# Patient Record
Sex: FEMALE | Race: BLACK OR AFRICAN AMERICAN | Employment: OTHER | URBAN - NONMETROPOLITAN AREA
[De-identification: names, ages, dates, MRNs, and addresses within clinical notes are randomized per-mention and may not be internally consistent; named-entity substitution may affect disease eponyms.]

---

## 2021-04-12 ENCOUNTER — HOSPITAL ENCOUNTER (EMERGENCY)
Age: 76
Discharge: HOME | End: 2021-04-12
Attending: FAMILY MEDICINE
Payer: MEDICARE

## 2021-04-12 ENCOUNTER — TRANSCRIBE ORDER (OUTPATIENT)
Dept: REGISTRATION | Age: 76
End: 2021-04-12

## 2021-04-12 ENCOUNTER — HOSPITAL ENCOUNTER (OUTPATIENT)
Dept: LAB | Age: 76
Discharge: HOME | End: 2021-04-12
Payer: MEDICARE

## 2021-04-12 VITALS
HEIGHT: 63 IN | TEMPERATURE: 98.1 F | WEIGHT: 180 LBS | HEART RATE: 98 BPM | BODY MASS INDEX: 31.89 KG/M2 | OXYGEN SATURATION: 100 % | RESPIRATION RATE: 18 BRPM | SYSTOLIC BLOOD PRESSURE: 130 MMHG | DIASTOLIC BLOOD PRESSURE: 72 MMHG

## 2021-04-12 DIAGNOSIS — D64.9 ANEMIA, UNSPECIFIED: ICD-10-CM

## 2021-04-12 DIAGNOSIS — E78.00 PURE HYPERCHOLESTEROLEMIA: ICD-10-CM

## 2021-04-12 DIAGNOSIS — E03.4 IDIOPATHIC ATROPHIC HYPOTHYROIDISM: Primary | ICD-10-CM

## 2021-04-12 DIAGNOSIS — E53.8 BIOTIN-(PROPIONYL-COA-CARBOXYLASE) LIGASE DEFICIENCY: ICD-10-CM

## 2021-04-12 DIAGNOSIS — E03.4 IDIOPATHIC ATROPHIC HYPOTHYROIDISM: ICD-10-CM

## 2021-04-12 DIAGNOSIS — N30.00 ACUTE CYSTITIS WITHOUT HEMATURIA: Primary | ICD-10-CM

## 2021-04-12 LAB
ALBUMIN SERPL-MCNC: 4 G/DL (ref 3.5–4.7)
ALBUMIN/GLOB SERPL: 1.1 {RATIO}
ALP SERPL-CCNC: 72 U/L (ref 38–126)
ALT SERPL-CCNC: 25 U/L (ref 3–52)
ANION GAP SERPL CALC-SCNC: 11 MMOL/L
APPEARANCE UR: CLEAR
AST SERPL W P-5'-P-CCNC: 26 U/L (ref 14–74)
BACTERIA URNS QL MICRO: ABNORMAL /HPF
BASOPHILS # BLD: 0 K/UL (ref 0–0.1)
BASOPHILS NFR BLD: 0 % (ref 0–2)
BILIRUB SERPL-MCNC: 0.6 MG/DL (ref 0.2–1)
BILIRUB UR QL: NEGATIVE
BUN SERPL-MCNC: 16 MG/DL (ref 9–21)
BUN/CREAT SERPL: 15
CA-I BLD-MCNC: 9.6 MG/DL (ref 8.5–10.5)
CHLORIDE SERPL-SCNC: 102 MMOL/L (ref 94–111)
CO2 SERPL-SCNC: 25 MMOL/L (ref 21–33)
COLOR UR: ABNORMAL
CREAT SERPL-MCNC: 1.1 MG/DL (ref 0.7–1.2)
EOSINOPHIL # BLD: 0.2 K/UL (ref 0–0.4)
EOSINOPHIL NFR BLD: 2 % (ref 0–5)
EPITH CASTS URNS QL MICRO: ABNORMAL /LPF (ref 0–20)
ERYTHROCYTE [DISTWIDTH] IN BLOOD BY AUTOMATED COUNT: 14.2 % (ref 11.6–14.5)
FERRITIN SERPL-MCNC: 65 NG/ML (ref 8–252)
GLOBULIN SER CALC-MCNC: 3.6 G/DL
GLUCOSE SERPL-MCNC: 96 MG/DL (ref 70–110)
GLUCOSE UR STRIP.AUTO-MCNC: NEGATIVE MG/DL
HCT VFR BLD AUTO: 35.9 % (ref 35–45)
HGB BLD-MCNC: 12.3 G/DL (ref 12–16)
HGB UR QL STRIP: ABNORMAL
IMM GRANULOCYTES # BLD AUTO: 0 K/UL
IMM GRANULOCYTES NFR BLD AUTO: 0 %
KETONES UR QL STRIP.AUTO: NEGATIVE MG/DL
LEUKOCYTE ESTERASE UR QL STRIP.AUTO: ABNORMAL
LYMPHOCYTES # BLD: 2.8 K/UL (ref 0.9–3.6)
LYMPHOCYTES NFR BLD: 34 % (ref 21–52)
MCH RBC QN AUTO: 27.6 PG (ref 24–34)
MCHC RBC AUTO-ENTMCNC: 34.3 G/DL (ref 31–37)
MCV RBC AUTO: 80.7 FL (ref 74–97)
MONOCYTES # BLD: 0.6 K/UL (ref 0.05–1.2)
MONOCYTES NFR BLD: 7 % (ref 3–10)
NEUTS SEG # BLD: 4.6 K/UL (ref 1.8–8)
NEUTS SEG NFR BLD: 57 % (ref 40–73)
NITRITE UR QL STRIP.AUTO: NEGATIVE
PH UR STRIP: 6 [PH] (ref 5–9)
PLATELET # BLD AUTO: 247 K/UL (ref 135–420)
PMV BLD AUTO: 10.3 FL (ref 9.2–11.8)
POTASSIUM SERPL-SCNC: 4 MMOL/L (ref 3.2–5.1)
PROT SERPL-MCNC: 7.6 G/DL (ref 6.1–8.4)
PROT UR STRIP-MCNC: NEGATIVE MG/DL
RBC # BLD AUTO: 4.45 M/UL (ref 4.2–5.3)
RBC #/AREA URNS HPF: ABNORMAL /HPF (ref 0–2)
SODIUM SERPL-SCNC: 138 MMOL/L (ref 135–145)
SP GR UR REFRACTOMETRY: 1.01 (ref 1–1.03)
T4 FREE SERPL-MCNC: 0.9 NG/DL (ref 0.8–1.5)
TSH SERPL DL<=0.05 MIU/L-ACNC: 1.66 UIU/ML (ref 0.35–6.2)
UROBILINOGEN UR QL STRIP.AUTO: 0.2 EU/DL (ref 0.2–1)
VIT B12 SERPL-MCNC: 1838 PG/ML (ref 193–986)
WBC # BLD AUTO: 8.1 K/UL (ref 4.6–13.2)
WBC URNS QL MICRO: ABNORMAL /HPF (ref 0–4)

## 2021-04-12 PROCEDURE — 82728 ASSAY OF FERRITIN: CPT

## 2021-04-12 PROCEDURE — 82607 VITAMIN B-12: CPT

## 2021-04-12 PROCEDURE — 80053 COMPREHEN METABOLIC PANEL: CPT

## 2021-04-12 PROCEDURE — 82746 ASSAY OF FOLIC ACID SERUM: CPT

## 2021-04-12 PROCEDURE — 82306 VITAMIN D 25 HYDROXY: CPT

## 2021-04-12 PROCEDURE — 99283 EMERGENCY DEPT VISIT LOW MDM: CPT

## 2021-04-12 PROCEDURE — 84439 ASSAY OF FREE THYROXINE: CPT

## 2021-04-12 PROCEDURE — 84443 ASSAY THYROID STIM HORMONE: CPT

## 2021-04-12 PROCEDURE — 85025 COMPLETE CBC W/AUTO DIFF WBC: CPT

## 2021-04-12 PROCEDURE — 81001 URINALYSIS AUTO W/SCOPE: CPT

## 2021-04-12 RX ORDER — PRAVASTATIN SODIUM 20 MG/1
20 TABLET ORAL
COMMUNITY

## 2021-04-12 RX ORDER — GLUCOSAMINE/CHONDR SU A SOD 750-600 MG
1000 TABLET ORAL
COMMUNITY

## 2021-04-12 RX ORDER — SERTRALINE HYDROCHLORIDE 50 MG/1
50 TABLET, FILM COATED ORAL DAILY
COMMUNITY

## 2021-04-12 RX ORDER — LANOLIN ALCOHOL/MO/W.PET/CERES
500 CREAM (GRAM) TOPICAL DAILY
COMMUNITY

## 2021-04-12 RX ORDER — HYDROCHLOROTHIAZIDE 12.5 MG/1
12.5 TABLET ORAL DAILY
COMMUNITY

## 2021-04-12 RX ORDER — LORATADINE 10 MG/1
10 TABLET ORAL
COMMUNITY

## 2021-04-12 RX ORDER — MELOXICAM 15 MG/1
15 TABLET ORAL DAILY
COMMUNITY

## 2021-04-12 RX ORDER — LOSARTAN POTASSIUM 50 MG/1
50 TABLET ORAL DAILY
COMMUNITY

## 2021-04-12 RX ORDER — FOLIC ACID 1 MG/1
1 TABLET ORAL DAILY
COMMUNITY

## 2021-04-12 RX ORDER — CEPHALEXIN 500 MG/1
500 CAPSULE ORAL 4 TIMES DAILY
Qty: 28 CAP | Refills: 0 | Status: SHIPPED | OUTPATIENT
Start: 2021-04-12 | End: 2021-04-19

## 2021-04-12 RX ORDER — CYCLOBENZAPRINE HCL 10 MG
10 TABLET ORAL
COMMUNITY

## 2021-04-12 NOTE — ED PROVIDER NOTES
EMERGENCY DEPARTMENT HISTORY AND PHYSICAL EXAM      Date: 4/12/2021  Patient Name: Nesha Rdz    History of Presenting Illness     Chief Complaint   Patient presents with    Flank Pain       History Provided By: Patient    HPI: Nesha Rdz, 76 y.o. female with a past medical history significant hypertension presents to the ED with cc of right flank pain. The patient states that she has had pain on and off for the past 3 weeks. The pain is mild. It gets better when she takes Naprosyn. She denies any nausea or vomiting with it. She denies dysuria or urinary frequency. She denies any fevers or chills. She was here to get some outpatient labs and her doctor told her to come by the emergency room for an evaluation of her pain. There are no other complaints, changes, or physical findings at this time. PCP: Juan F Garcia MD    No current facility-administered medications on file prior to encounter. Current Outpatient Medications on File Prior to Encounter   Medication Sig Dispense Refill    losartan (COZAAR) 50 mg tablet Take 50 mg by mouth daily.  sertraline (ZOLOFT) 50 mg tablet Take 50 mg by mouth daily.  loratadine (CLARITIN) 10 mg tablet Take 10 mg by mouth.  meloxicam (MOBIC) 15 mg tablet Take 15 mg by mouth daily.  pravastatin (PravachoL) 20 mg tablet Take 20 mg by mouth nightly.  hydroCHLOROthiazide (HYDRODIURIL) 12.5 mg tablet Take 12.5 mg by mouth daily.  cyclobenzaprine (FLEXERIL) 10 mg tablet Take 10 mg by mouth three (3) times daily as needed for Muscle Spasm(s).  folic acid (FOLVITE) 1 mg tablet Take 1 mg by mouth daily.  Biotin 2,500 mcg cap Take 1,000 mcg by mouth.  cyanocobalamin (VITAMIN B12) 500 mcg tablet Take 500 mcg by mouth daily.          Past History     Past Medical History:  Past Medical History:   Diagnosis Date    Arthritis     High cholesterol     Hypertension        Past Surgical History:  Past Surgical History:   Procedure Laterality Date    HX APPENDECTOMY      HX CHOLECYSTECTOMY      HX HYSTERECTOMY         Family History:  History reviewed. No pertinent family history. Social History:  Social History     Tobacco Use    Smoking status: Never Smoker    Smokeless tobacco: Never Used   Substance Use Topics    Alcohol use: Never     Frequency: Never    Drug use: Never       Allergies:  No Known Allergies      Review of Systems     Review of Systems   Constitutional: Negative for fatigue and fever. HENT: Negative for rhinorrhea and sore throat. Respiratory: Negative for cough and shortness of breath. Cardiovascular: Negative for chest pain and palpitations. Gastrointestinal: Negative for abdominal pain, diarrhea, nausea and vomiting. Genitourinary: Positive for flank pain. Negative for difficulty urinating and dysuria. Musculoskeletal: Negative for arthralgias and myalgias. Skin: Negative for color change and rash. Neurological: Negative for light-headedness and headaches. Physical Exam     Physical Exam  Vitals signs and nursing note reviewed. Constitutional:       General: She is awake. She is not in acute distress. Appearance: Normal appearance. She is well-developed. She is obese. She is not ill-appearing, toxic-appearing or diaphoretic. Interventions: Face mask in place. Comments: elderly   HENT:      Head: Normocephalic and atraumatic. Eyes:      Conjunctiva/sclera: Conjunctivae normal.      Pupils: Pupils are equal, round, and reactive to light. Neck:      Musculoskeletal: Normal range of motion and neck supple. Cardiovascular:      Rate and Rhythm: Normal rate and regular rhythm. Pulses: Normal pulses. Heart sounds: Normal heart sounds. Pulmonary:      Effort: Pulmonary effort is normal.      Breath sounds: Normal breath sounds. Abdominal:      General: Abdomen is flat. Palpations: Abdomen is soft. Tenderness:  There is no abdominal tenderness. Musculoskeletal:      Comments: Tenderness under left scapula. Skin:     General: Skin is warm and dry. Neurological:      Mental Status: She is alert and oriented to person, place, and time. GCS: GCS eye subscore is 4. GCS verbal subscore is 5. GCS motor subscore is 6. Psychiatric:         Mood and Affect: Mood and affect normal.         Behavior: Behavior normal. Behavior is cooperative. Thought Content: Thought content normal.         Lab and Diagnostic Study Results     Labs -     Recent Results (from the past 12 hour(s))   URINALYSIS W/ RFLX MICROSCOPIC    Collection Time: 04/12/21 10:35 AM   Result Value Ref Range    Color Yellow/Straw      Appearance Clear Clear      Specific gravity 1.015 1.003 - 1.035      pH (UA) 6.0 5.0 - 9.0      Protein Negative Negative mg/dL    Glucose Negative Negative mg/dL    Ketone Negative Negative mg/dL    Bilirubin Negative Negative      Blood Small (A) Negative      Urobilinogen 0.2 0.2 - 1.0 EU/dL    Nitrites Negative Negative      Leukocyte Esterase Moderate (A) Negative     URINE MICROSCOPIC    Collection Time: 04/12/21 10:35 AM   Result Value Ref Range    WBC 5-10 0 - 4 /hpf    RBC 0-5 0 - 2 /hpf    Epithelial cells Moderate 0 - 20 /lpf    Bacteria 2+ (A) None /hpf       Radiologic Studies -   @lastxrresult@  CT Results  (Last 48 hours)    None        CXR Results  (Last 48 hours)    None            Medical Decision Making   - I am the first provider for this patient. - I reviewed the vital signs, available nursing notes, past medical history, past surgical history, family history and social history. - Initial assessment performed. The patients presenting problems have been discussed, and they are in agreement with the care plan formulated and outlined with them. I have encouraged them to ask questions as they arise throughout their visit. Vital Signs-Reviewed the patient's vital signs.   Patient Vitals for the past 12 hrs:   Temp Pulse Resp BP SpO2   04/12/21 1056 -- -- -- 130/72 --   04/12/21 1045 98.1 °F (36.7 °C) 98 18 (!) 183/99 100 %       Records Reviewed: Nursing Notes    The patient presents with abdominal pain with a differential diagnosis of renal Colic, UTI, vomiting and musculoskeletal pain. ED Course:          Provider Notes (Medical Decision Making):   5189 -patient presented with some right flank pain. She denied any fevers or chills. She has a UTI. She is can be started on antibiotics. MDM       Procedures   Medical Decision Makingedical Decision Making      Disposition   Disposition:     Discharged    DISCHARGE PLAN:  1. Current Discharge Medication List      CONTINUE these medications which have NOT CHANGED    Details   losartan (COZAAR) 50 mg tablet Take 50 mg by mouth daily. sertraline (ZOLOFT) 50 mg tablet Take 50 mg by mouth daily. loratadine (CLARITIN) 10 mg tablet Take 10 mg by mouth.      meloxicam (MOBIC) 15 mg tablet Take 15 mg by mouth daily. pravastatin (PravachoL) 20 mg tablet Take 20 mg by mouth nightly. hydroCHLOROthiazide (HYDRODIURIL) 12.5 mg tablet Take 12.5 mg by mouth daily. cyclobenzaprine (FLEXERIL) 10 mg tablet Take 10 mg by mouth three (3) times daily as needed for Muscle Spasm(s). folic acid (FOLVITE) 1 mg tablet Take 1 mg by mouth daily. Biotin 2,500 mcg cap Take 1,000 mcg by mouth.      cyanocobalamin (VITAMIN B12) 500 mcg tablet Take 500 mcg by mouth daily. 2.   Follow-up Information     Follow up With Specialties Details Why Contact Info    Maria R Peck MD Internal Medicine  As needed [address] [place] [address] [address] [address] [phone]          3. Return to ED if worse   4. Current Discharge Medication List      START taking these medications    Details   cephALEXin (Keflex) 500 mg capsule Take 1 Cap by mouth four (4) times daily for 7 days.   Qty: 28 Cap, Refills: 0         CONTINUE these medications which have NOT CHANGED Details   cyclobenzaprine (FLEXERIL) 10 mg tablet Take 10 mg by mouth three (3) times daily as needed for Muscle Spasm(s). Diagnosis     Clinical Impression:   1. Acute cystitis without hematuria        Attestations:    Camilo Ochoa MD    Please note that this dictation was completed with [employer], the computer voice recognition software. Quite often unanticipated grammatical, syntax, homophones, and other interpretive errors are inadvertently transcribed by the computer software. Please disregard these errors. Please excuse any errors that have escaped final proofreading. Thank you.

## 2021-04-12 NOTE — ED TRIAGE NOTES
Pt states \"I have had right sided pressure/pulling for 2 weeks. I also noticed a bump on my stomach about 2 weeks ago. I'm having low stomach pressure too. \" Pt states that she is not having \"pain\" but the pulling/pressure sensation is intermittent.

## 2021-04-13 LAB
25(OH)D3 SERPL-MCNC: 28 NG/ML (ref 30–100)
FOLATE SERPL-MCNC: 64.4 NG/ML (ref 5–21)

## 2021-05-04 ENCOUNTER — HOSPITAL ENCOUNTER (OUTPATIENT)
Dept: LAB | Age: 76
Discharge: HOME | End: 2021-05-04

## 2021-05-04 PROCEDURE — 99001 SPECIMEN HANDLING PT-LAB: CPT

## 2021-05-14 ENCOUNTER — TRANSCRIBE ORDER (OUTPATIENT)
Dept: SCHEDULING | Age: 76
End: 2021-05-14

## 2021-05-14 DIAGNOSIS — Z12.31 VISIT FOR SCREENING MAMMOGRAM: Primary | ICD-10-CM

## 2021-06-04 ENCOUNTER — HOSPITAL ENCOUNTER (OUTPATIENT)
Dept: MAMMOGRAPHY | Age: 76
Discharge: HOME | End: 2021-06-04
Payer: MEDICARE

## 2021-06-04 DIAGNOSIS — Z12.31 VISIT FOR SCREENING MAMMOGRAM: ICD-10-CM

## 2021-06-04 PROCEDURE — 77067 SCR MAMMO BI INCL CAD: CPT

## 2021-09-27 ENCOUNTER — TRANSCRIBE ORDER (OUTPATIENT)
Dept: SCHEDULING | Age: 76
End: 2021-09-27

## 2021-09-27 DIAGNOSIS — N83.202 LEFT OVARIAN CYST: Primary | ICD-10-CM

## 2021-09-30 ENCOUNTER — HOSPITAL ENCOUNTER (OUTPATIENT)
Dept: ULTRASOUND IMAGING | Age: 76
Discharge: HOME | End: 2021-09-30
Payer: MEDICARE

## 2021-09-30 DIAGNOSIS — N83.202 LEFT OVARIAN CYST: ICD-10-CM

## 2021-09-30 PROCEDURE — 76856 US EXAM PELVIC COMPLETE: CPT

## 2021-10-07 ENCOUNTER — HOSPITAL ENCOUNTER (OUTPATIENT)
Dept: LAB | Age: 76
Discharge: HOME | End: 2021-10-07

## 2021-10-07 PROCEDURE — 99001 SPECIMEN HANDLING PT-LAB: CPT

## 2022-08-09 ENCOUNTER — APPOINTMENT (OUTPATIENT)
Dept: LAB | Age: 77
End: 2022-08-09
Payer: MEDICARE

## 2022-08-09 ENCOUNTER — HOSPITAL ENCOUNTER (OUTPATIENT)
Dept: LAB | Age: 77
Discharge: HOME | End: 2022-08-09
Payer: MEDICARE

## 2022-08-09 LAB
ALBUMIN SERPL-MCNC: 3.5 G/DL (ref 3.4–5)
ALBUMIN/GLOB SERPL: 0.8 {RATIO} (ref 0.8–1.7)
ALP SERPL-CCNC: 87 U/L (ref 45–117)
ALT SERPL-CCNC: 27 U/L (ref 13–56)
ANION GAP SERPL CALC-SCNC: 10 MMOL/L (ref 3–18)
AST SERPL W P-5'-P-CCNC: 28 U/L (ref 10–38)
BASOPHILS # BLD: 0 K/UL (ref 0–0.1)
BASOPHILS NFR BLD: 1 % (ref 0–2)
BILIRUB SERPL-MCNC: 0.6 MG/DL (ref 0.2–1)
BUN SERPL-MCNC: 17 MG/DL (ref 7–18)
BUN/CREAT SERPL: 15 (ref 12–20)
CA-I BLD-MCNC: 9.5 MG/DL (ref 8.5–10.1)
CHLORIDE SERPL-SCNC: 107 MMOL/L (ref 100–111)
CO2 SERPL-SCNC: 26 MMOL/L (ref 21–32)
CREAT SERPL-MCNC: 1.15 MG/DL (ref 0.6–1.3)
DIFFERENTIAL METHOD BLD: NORMAL
EOSINOPHIL # BLD: 0.2 K/UL (ref 0–0.4)
EOSINOPHIL NFR BLD: 2 % (ref 0–5)
ERYTHROCYTE [DISTWIDTH] IN BLOOD BY AUTOMATED COUNT: 14.1 % (ref 11.6–14.5)
GLOBULIN SER CALC-MCNC: 4.4 G/DL (ref 2–4)
GLUCOSE SERPL-MCNC: 83 MG/DL (ref 74–99)
HCT VFR BLD AUTO: 35.9 % (ref 35–45)
HGB BLD-MCNC: 12.4 G/DL (ref 12–16)
IMM GRANULOCYTES # BLD AUTO: 0 K/UL (ref 0–0.04)
IMM GRANULOCYTES NFR BLD AUTO: 0 % (ref 0–0.5)
LYMPHOCYTES # BLD: 2.8 K/UL (ref 0.9–3.6)
LYMPHOCYTES NFR BLD: 32 % (ref 21–52)
MCH RBC QN AUTO: 28.2 PG (ref 24–34)
MCHC RBC AUTO-ENTMCNC: 34.5 G/DL (ref 31–37)
MCV RBC AUTO: 81.6 FL (ref 78–100)
MONOCYTES # BLD: 0.6 K/UL (ref 0.05–1.2)
MONOCYTES NFR BLD: 7 % (ref 3–10)
NEUTS SEG # BLD: 5.2 K/UL (ref 1.8–8)
NEUTS SEG NFR BLD: 58 % (ref 40–73)
NRBC # BLD: 0 K/UL (ref 0–0.01)
NRBC BLD-RTO: 0 PER 100 WBC
PLATELET # BLD AUTO: 230 K/UL (ref 135–420)
PMV BLD AUTO: 10.5 FL (ref 9.2–11.8)
POTASSIUM SERPL-SCNC: 3.9 MMOL/L (ref 3.5–5.5)
PROT SERPL-MCNC: 7.9 G/DL (ref 6.4–8.2)
RBC # BLD AUTO: 4.4 M/UL (ref 4.2–5.3)
SODIUM SERPL-SCNC: 143 MMOL/L (ref 136–145)
TSH SERPL DL<=0.05 MIU/L-ACNC: 1.6 UIU/ML (ref 0.36–3.74)
WBC # BLD AUTO: 8.8 K/UL (ref 4.6–13.2)

## 2022-08-09 PROCEDURE — 82306 VITAMIN D 25 HYDROXY: CPT

## 2022-08-09 PROCEDURE — 80053 COMPREHEN METABOLIC PANEL: CPT

## 2022-08-09 PROCEDURE — 80061 LIPID PANEL: CPT

## 2022-08-09 PROCEDURE — 84439 ASSAY OF FREE THYROXINE: CPT

## 2022-08-09 PROCEDURE — 84443 ASSAY THYROID STIM HORMONE: CPT

## 2022-08-09 PROCEDURE — 82728 ASSAY OF FERRITIN: CPT

## 2022-08-09 PROCEDURE — 82607 VITAMIN B-12: CPT

## 2022-08-09 PROCEDURE — 85025 COMPLETE CBC W/AUTO DIFF WBC: CPT

## 2022-08-10 LAB
25(OH)D3 SERPL-MCNC: 36.2 NG/ML (ref 30–100)
CHOLEST SERPL-MCNC: 110 MG/DL
FERRITIN SERPL-MCNC: 82 NG/ML (ref 8–388)
FOLATE SERPL-MCNC: >20 NG/ML (ref 3.1–17.5)
HDLC SERPL-MCNC: 39 MG/DL (ref 40–60)
HDLC SERPL: 2.8 {RATIO} (ref 0–5)
LDLC SERPL CALC-MCNC: 52.6 MG/DL (ref 0–100)
LIPID PROFILE,FLP: ABNORMAL
T4 FREE SERPL-MCNC: 1.1 NG/DL (ref 0.7–1.5)
TRIGL SERPL-MCNC: 92 MG/DL (ref ?–150)
VIT B12 SERPL-MCNC: 1297 PG/ML (ref 211–911)
VLDLC SERPL CALC-MCNC: 18.4 MG/DL

## 2022-09-09 ENCOUNTER — TRANSCRIBE ORDER (OUTPATIENT)
Dept: SCHEDULING | Age: 77
End: 2022-09-09

## 2022-09-09 DIAGNOSIS — Z12.31 VISIT FOR SCREENING MAMMOGRAM: Primary | ICD-10-CM

## 2022-09-26 ENCOUNTER — HOSPITAL ENCOUNTER (OUTPATIENT)
Dept: MAMMOGRAPHY | Age: 77
Discharge: HOME | End: 2022-09-26
Payer: MEDICARE

## 2022-09-26 DIAGNOSIS — Z12.31 VISIT FOR SCREENING MAMMOGRAM: ICD-10-CM

## 2022-09-26 PROCEDURE — 77063 BREAST TOMOSYNTHESIS BI: CPT

## 2022-10-05 ENCOUNTER — TRANSCRIBE ORDER (OUTPATIENT)
Dept: SCHEDULING | Age: 77
End: 2022-10-05

## 2022-10-05 DIAGNOSIS — R19.09 GROIN SWELLING: Primary | ICD-10-CM

## 2022-10-05 DIAGNOSIS — N39.0 UTI (URINARY TRACT INFECTION): ICD-10-CM

## 2022-10-05 DIAGNOSIS — R10.2 ADNEXAL PAIN: ICD-10-CM

## 2022-10-05 DIAGNOSIS — R31.9 BLOOD IN URINE: ICD-10-CM

## 2022-10-10 ENCOUNTER — HOSPITAL ENCOUNTER (OUTPATIENT)
Dept: ULTRASOUND IMAGING | Age: 77
Discharge: HOME | End: 2022-10-10
Payer: MEDICARE

## 2022-10-10 DIAGNOSIS — R10.2 ADNEXAL PAIN: ICD-10-CM

## 2022-10-10 DIAGNOSIS — N39.0 UTI (URINARY TRACT INFECTION): ICD-10-CM

## 2022-10-10 DIAGNOSIS — R31.9 BLOOD IN URINE: ICD-10-CM

## 2022-10-10 DIAGNOSIS — R19.09 GROIN SWELLING: ICD-10-CM

## 2022-10-10 PROCEDURE — 76770 US EXAM ABDO BACK WALL COMP: CPT

## 2022-10-10 PROCEDURE — 76856 US EXAM PELVIC COMPLETE: CPT

## 2023-05-08 ENCOUNTER — HOSPITAL ENCOUNTER (OUTPATIENT)
Age: 78
Discharge: HOME | End: 2023-05-11

## 2023-05-08 LAB — LABCORP DRAW FEE: NORMAL

## 2023-05-08 PROCEDURE — 99001 SPECIMEN HANDLING PT-LAB: CPT

## 2023-10-05 ENCOUNTER — HOSPITAL ENCOUNTER (OUTPATIENT)
Age: 78
Discharge: HOME | End: 2023-10-05
Payer: MEDICARE

## 2023-10-05 VITALS — BODY MASS INDEX: 33.13 KG/M2 | WEIGHT: 187 LBS | HEIGHT: 63 IN

## 2023-10-05 DIAGNOSIS — Z12.31 VISIT FOR SCREENING MAMMOGRAM: ICD-10-CM

## 2023-10-05 PROCEDURE — 77067 SCR MAMMO BI INCL CAD: CPT

## 2023-11-02 ENCOUNTER — HOSPITAL ENCOUNTER (OUTPATIENT)
Age: 78
Discharge: HOME | End: 2023-11-05

## 2023-11-02 ENCOUNTER — HOSPITAL ENCOUNTER (OUTPATIENT)
Age: 78
Discharge: HOME | End: 2023-11-02
Payer: MEDICARE

## 2023-11-02 DIAGNOSIS — M15.3 SECONDARY LOCALIZED OSTEOARTHROSIS OF MULTIPLE SITES: ICD-10-CM

## 2023-11-02 PROCEDURE — 73610 X-RAY EXAM OF ANKLE: CPT

## 2024-04-29 ENCOUNTER — HOSPITAL ENCOUNTER (EMERGENCY)
Age: 79
Discharge: HOME | End: 2024-04-29
Attending: EMERGENCY MEDICINE
Payer: MEDICARE

## 2024-04-29 VITALS
TEMPERATURE: 98.2 F | HEART RATE: 87 BPM | OXYGEN SATURATION: 95 % | WEIGHT: 185 LBS | RESPIRATION RATE: 13 BRPM | BODY MASS INDEX: 34.04 KG/M2 | SYSTOLIC BLOOD PRESSURE: 124 MMHG | DIASTOLIC BLOOD PRESSURE: 80 MMHG | HEIGHT: 62 IN
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
## 2024-04-29 PROCEDURE — 99283 EMERGENCY DEPT VISIT LOW MDM: CPT

## 2024-04-29 RX ORDER — LIDOCAINE 4 G/G
1 PATCH TOPICAL DAILY
Qty: 30 PATCH | Refills: 0 | Status: SHIPPED | OUTPATIENT
Start: 2024-04-29 | End: 2024-05-29

## 2024-04-29 NOTE — ED TRIAGE NOTES
Reports that she fell out of the back door this morning. Bumped her head on the railing. States that she has had back, bilateral knee and bilateral ankle pain/issues for years \" but I just need to have them checked out\". States that her doctor is following her for arthritic pain of knees and ankles and she takes medication for this discomfort. Skin is intact. No bruising or abrasions. Scalp assessed and no bruising, swelling or abrasions were found. States that she has had back, knee and ankle problems for years. Nothing new today.

## 2024-04-29 NOTE — ED PROVIDER NOTES
[facility] EMERGENCY DEPT  EMERGENCY DEPARTMENT HISTORY AND PHYSICAL EXAM      Date: 4/29/2024  Patient Name: Yolie Limon  MRN: [id number]  Birthdate 1945  Date of evaluation: 4/29/2024  Provider: Gilbert Duggan MD   Note Started: 11:31 AM EDT 4/29/24    HISTORY OF PRESENT ILLNESS     Chief Complaint   Patient presents with    Fall       History Provided By: Patient    HPI: Yolie Limon is a 78 y.o. female was steipping onto back porch and knee gave out, falling down. Mild head injury to rail on left.     States has chronic pain in lower back, bilateral knees but states this is chronic and unchanged.     No blood thinning medications.     PAST MEDICAL HISTORY   Past Medical History:  Past Medical History:   Diagnosis Date    Arthritis     Breast cyst     High cholesterol     Hypertension     Menopause        Past Surgical History:  Past Surgical History:   Procedure Laterality Date    APPENDECTOMY      CHOLECYSTECTOMY      HYSTERECTOMY (CERVIX STATUS UNKNOWN)      Partial       Family History:  History reviewed. No pertinent family history.    Social History:  Social History     Tobacco Use    Smoking status: Never    Smokeless tobacco: Never   Substance Use Topics    Alcohol use: Never    Drug use: Never       Allergies:  No Known Allergies    PCP: Desiree Tipton MD    Current Meds:   No current facility-administered medications for this encounter.     Current Outpatient Medications   Medication Sig Dispense Refill    lidocaine 4 % external patch Place 1 patch onto the skin daily 30 patch 0    Biotin 2.5 MG CAPS Take 1,000 mcg by mouth      cyanocobalamin 500 MCG tablet Take 500 mcg by mouth daily      cyclobenzaprine (FLEXERIL) 10 MG tablet Take 10 mg by mouth 3 times daily as needed      folic acid (FOLVITE) 1 MG tablet Take 1 mg by mouth daily      hydroCHLOROthiazide (HYDRODIURIL) 12.5 MG tablet Take 12.5 mg by mouth daily      loratadine (CLARITIN) 10 MG tablet Take 10 mg by mouth

## 2024-07-25 ENCOUNTER — HOSPITAL ENCOUNTER (OUTPATIENT)
Age: 79
Discharge: HOME | End: 2024-07-28

## 2024-07-25 LAB — LABCORP DRAW FEE: NORMAL

## 2024-07-25 PROCEDURE — 99001 SPECIMEN HANDLING PT-LAB: CPT

## 2024-08-27 ENCOUNTER — HOSPITAL ENCOUNTER (EMERGENCY)
Age: 79
Discharge: ANOTHER HOSPITAL | End: 2024-08-28
Attending: EMERGENCY MEDICINE
Payer: MEDICARE

## 2024-08-27 DIAGNOSIS — D68.9 COAGULOPATHY (HCC): ICD-10-CM

## 2024-08-27 DIAGNOSIS — N94.89 ADNEXAL MASS: ICD-10-CM

## 2024-08-27 DIAGNOSIS — K76.7 HEPATORENAL FAILURE (HCC): Primary | ICD-10-CM

## 2024-08-27 PROCEDURE — 99285 EMERGENCY DEPT VISIT HI MDM: CPT

## 2024-08-27 RX ORDER — CEFUROXIME AXETIL 500 MG/1
500 TABLET ORAL 2 TIMES DAILY
Status: ON HOLD | COMMUNITY
End: 2024-09-01 | Stop reason: HOSPADM

## 2024-08-27 ASSESSMENT — LIFESTYLE VARIABLES
HOW MANY STANDARD DRINKS CONTAINING ALCOHOL DO YOU HAVE ON A TYPICAL DAY: PATIENT DOES NOT DRINK
HOW OFTEN DO YOU HAVE A DRINK CONTAINING ALCOHOL: NEVER

## 2024-08-27 ASSESSMENT — PAIN SCALES - GENERAL: PAINLEVEL_OUTOF10: 5

## 2024-08-27 ASSESSMENT — PAIN DESCRIPTION - ORIENTATION: ORIENTATION: RIGHT;LEFT

## 2024-08-27 ASSESSMENT — PAIN DESCRIPTION - PAIN TYPE: TYPE: ACUTE PAIN

## 2024-08-27 ASSESSMENT — PAIN - FUNCTIONAL ASSESSMENT: PAIN_FUNCTIONAL_ASSESSMENT: 0-10

## 2024-08-27 ASSESSMENT — PAIN DESCRIPTION - LOCATION: LOCATION: FLANK

## 2024-08-28 ENCOUNTER — HOSPITAL ENCOUNTER (INPATIENT)
Facility: HOSPITAL | Age: 79
LOS: 4 days | Discharge: HOME HEALTH | DRG: 872 | End: 2024-09-01
Attending: STUDENT IN AN ORGANIZED HEALTH CARE EDUCATION/TRAINING PROGRAM | Admitting: HOSPITALIST
Payer: MEDICARE

## 2024-08-28 ENCOUNTER — APPOINTMENT (OUTPATIENT)
Age: 79
End: 2024-08-28
Payer: MEDICARE

## 2024-08-28 VITALS
WEIGHT: 171 LBS | DIASTOLIC BLOOD PRESSURE: 65 MMHG | HEART RATE: 81 BPM | OXYGEN SATURATION: 93 % | TEMPERATURE: 97.6 F | RESPIRATION RATE: 14 BRPM | BODY MASS INDEX: 26.84 KG/M2 | SYSTOLIC BLOOD PRESSURE: 112 MMHG | HEIGHT: 67 IN

## 2024-08-28 PROBLEM — N17.9 AKI (ACUTE KIDNEY INJURY) (HCC): Status: ACTIVE | Noted: 2024-08-28

## 2024-08-28 LAB
ALBUMIN SERPL-MCNC: 3.1 G/DL (ref 3.4–5)
ALBUMIN/GLOB SERPL: 0.6 (ref 0.8–1.7)
ALP SERPL-CCNC: 390 U/L (ref 45–117)
ALT SERPL-CCNC: 178 U/L (ref 13–56)
ANION GAP SERPL CALC-SCNC: 12 MMOL/L (ref 3–18)
APPEARANCE UR: ABNORMAL
APTT PPP: 30.6 SEC (ref 23–36.4)
AST SERPL W P-5'-P-CCNC: 488 U/L (ref 10–38)
BACTERIA URNS QL MICRO: ABNORMAL /HPF
BASOPHILS # BLD: 0 K/UL (ref 0–0.1)
BASOPHILS NFR BLD: 0 % (ref 0–2)
BILIRUB SERPL-MCNC: 18 MG/DL (ref 0.2–1)
BILIRUB UR QL: ABNORMAL
BUN SERPL-MCNC: 91 MG/DL (ref 7–18)
BUN/CREAT SERPL: 26 (ref 12–20)
CA-I BLD-MCNC: 10.9 MG/DL (ref 8.5–10.1)
CHLORIDE SERPL-SCNC: 105 MMOL/L (ref 100–111)
CO2 SERPL-SCNC: 20 MMOL/L (ref 21–32)
COLOR UR: ABNORMAL
CREAT SERPL-MCNC: 3.44 MG/DL (ref 0.6–1.3)
DIFFERENTIAL METHOD BLD: ABNORMAL
EOSINOPHIL # BLD: 0 K/UL (ref 0–0.4)
EOSINOPHIL NFR BLD: 0 % (ref 0–5)
EPITH CASTS URNS QL MICRO: ABNORMAL /LPF (ref 0–20)
ERYTHROCYTE [DISTWIDTH] IN BLOOD BY AUTOMATED COUNT: 16.9 % (ref 11.6–14.5)
GLOBULIN SER CALC-MCNC: 5.4 G/DL (ref 2–4)
GLUCOSE SERPL-MCNC: 94 MG/DL (ref 74–99)
GLUCOSE UR STRIP.AUTO-MCNC: NEGATIVE MG/DL
HCT VFR BLD AUTO: 34.5 % (ref 35–45)
HGB BLD-MCNC: 13 G/DL (ref 12–16)
HGB UR QL STRIP: NEGATIVE
IMM GRANULOCYTES # BLD AUTO: 0 K/UL
IMM GRANULOCYTES NFR BLD AUTO: 0 %
INR PPP: 1.5 (ref 0.9–1.1)
KETONES UR QL STRIP.AUTO: NEGATIVE MG/DL
LEUKOCYTE ESTERASE UR QL STRIP.AUTO: ABNORMAL
LIPASE SERPL-CCNC: 140 U/L (ref 13–75)
LYMPHOCYTES # BLD: 2.8 K/UL (ref 0.9–3.6)
LYMPHOCYTES NFR BLD: 10 % (ref 21–52)
MCH RBC QN AUTO: 28.3 PG (ref 24–34)
MCHC RBC AUTO-ENTMCNC: 37.7 G/DL (ref 31–37)
MCV RBC AUTO: 75 FL (ref 78–100)
MONOCYTES # BLD: 0.3 K/UL (ref 0.05–1.2)
MONOCYTES NFR BLD: 1 % (ref 3–10)
NEUTS SEG # BLD: 24.8 K/UL (ref 1.8–8)
NEUTS SEG NFR BLD: 89 % (ref 40–73)
NITRITE UR QL STRIP.AUTO: POSITIVE
NRBC # BLD: 0.18 K/UL (ref 0–0.01)
NRBC BLD-RTO: 0.6 PER 100 WBC
PH UR STRIP: 5 (ref 5–8)
PLATELET # BLD AUTO: 254 K/UL (ref 135–420)
PMV BLD AUTO: 10.6 FL (ref 9.2–11.8)
POTASSIUM SERPL-SCNC: 5.1 MMOL/L (ref 3.5–5.5)
PROT SERPL-MCNC: 8.5 G/DL (ref 6.4–8.2)
PROT UR STRIP-MCNC: 100 MG/DL
PROTHROMBIN TIME: 17.8 SEC (ref 11.9–14.9)
RBC # BLD AUTO: 4.6 M/UL (ref 4.2–5.3)
RBC #/AREA URNS HPF: ABNORMAL /HPF (ref 0–2)
RBC MORPH BLD: ABNORMAL
SODIUM SERPL-SCNC: 137 MMOL/L (ref 136–145)
SP GR UR REFRACTOMETRY: 1.02 (ref 1–1.03)
THERAPEUTIC RANGE: NORMAL SEC (ref 73–110)
UROBILINOGEN UR QL STRIP.AUTO: 1 EU/DL (ref 0.2–1)
WBC # BLD AUTO: 27.9 K/UL (ref 4.6–13.2)
WBC URNS QL MICRO: ABNORMAL /HPF (ref 0–4)

## 2024-08-28 PROCEDURE — 99223 1ST HOSP IP/OBS HIGH 75: CPT | Performed by: HOSPITALIST

## 2024-08-28 PROCEDURE — 2580000003 HC RX 258: Performed by: EMERGENCY MEDICINE

## 2024-08-28 PROCEDURE — 83690 ASSAY OF LIPASE: CPT

## 2024-08-28 PROCEDURE — 6360000002 HC RX W HCPCS: Performed by: EMERGENCY MEDICINE

## 2024-08-28 PROCEDURE — P9047 ALBUMIN (HUMAN), 25%, 50ML: HCPCS | Performed by: INTERNAL MEDICINE

## 2024-08-28 PROCEDURE — 94761 N-INVAS EAR/PLS OXIMETRY MLT: CPT

## 2024-08-28 PROCEDURE — 80053 COMPREHEN METABOLIC PANEL: CPT

## 2024-08-28 PROCEDURE — 71250 CT THORAX DX C-: CPT

## 2024-08-28 PROCEDURE — 96374 THER/PROPH/DIAG INJ IV PUSH: CPT

## 2024-08-28 PROCEDURE — 2580000003 HC RX 258: Performed by: HOSPITALIST

## 2024-08-28 PROCEDURE — 96361 HYDRATE IV INFUSION ADD-ON: CPT

## 2024-08-28 PROCEDURE — 1100000000 HC RM PRIVATE

## 2024-08-28 PROCEDURE — 85025 COMPLETE CBC W/AUTO DIFF WBC: CPT

## 2024-08-28 PROCEDURE — 6360000002 HC RX W HCPCS: Performed by: HOSPITALIST

## 2024-08-28 PROCEDURE — 85730 THROMBOPLASTIN TIME PARTIAL: CPT

## 2024-08-28 PROCEDURE — 6360000002 HC RX W HCPCS: Performed by: INTERNAL MEDICINE

## 2024-08-28 PROCEDURE — 85610 PROTHROMBIN TIME: CPT

## 2024-08-28 PROCEDURE — 81001 URINALYSIS AUTO W/SCOPE: CPT

## 2024-08-28 RX ORDER — SODIUM CHLORIDE 0.9 % (FLUSH) 0.9 %
5-40 SYRINGE (ML) INJECTION EVERY 12 HOURS SCHEDULED
Status: DISCONTINUED | OUTPATIENT
Start: 2024-08-28 | End: 2024-09-01 | Stop reason: HOSPADM

## 2024-08-28 RX ORDER — 0.9 % SODIUM CHLORIDE 0.9 %
500 INTRAVENOUS SOLUTION INTRAVENOUS ONCE
Status: COMPLETED | OUTPATIENT
Start: 2024-08-28 | End: 2024-08-28

## 2024-08-28 RX ORDER — 0.9 % SODIUM CHLORIDE 0.9 %
1000 INTRAVENOUS SOLUTION INTRAVENOUS ONCE
Status: COMPLETED | OUTPATIENT
Start: 2024-08-28 | End: 2024-08-28

## 2024-08-28 RX ORDER — HEPARIN SODIUM 5000 [USP'U]/ML
5000 INJECTION, SOLUTION INTRAVENOUS; SUBCUTANEOUS EVERY 8 HOURS SCHEDULED
Status: DISCONTINUED | OUTPATIENT
Start: 2024-08-28 | End: 2024-09-01 | Stop reason: HOSPADM

## 2024-08-28 RX ORDER — ONDANSETRON 2 MG/ML
4 INJECTION INTRAMUSCULAR; INTRAVENOUS EVERY 6 HOURS PRN
Status: DISCONTINUED | OUTPATIENT
Start: 2024-08-28 | End: 2024-09-01 | Stop reason: HOSPADM

## 2024-08-28 RX ORDER — FOLIC ACID 1 MG/1
1 TABLET ORAL DAILY
Status: DISCONTINUED | OUTPATIENT
Start: 2024-08-29 | End: 2024-09-01 | Stop reason: HOSPADM

## 2024-08-28 RX ORDER — ONDANSETRON 4 MG/1
4 TABLET, ORALLY DISINTEGRATING ORAL EVERY 8 HOURS PRN
Status: DISCONTINUED | OUTPATIENT
Start: 2024-08-28 | End: 2024-09-01 | Stop reason: HOSPADM

## 2024-08-28 RX ORDER — SODIUM CHLORIDE 9 MG/ML
INJECTION, SOLUTION INTRAVENOUS PRN
Status: DISCONTINUED | OUTPATIENT
Start: 2024-08-28 | End: 2024-09-01 | Stop reason: HOSPADM

## 2024-08-28 RX ORDER — UREA 10 %
500 LOTION (ML) TOPICAL DAILY
Status: DISCONTINUED | OUTPATIENT
Start: 2024-08-29 | End: 2024-09-01 | Stop reason: HOSPADM

## 2024-08-28 RX ORDER — ACETAMINOPHEN 500 MG
500 TABLET ORAL EVERY 8 HOURS PRN
Status: DISCONTINUED | OUTPATIENT
Start: 2024-08-28 | End: 2024-09-01 | Stop reason: HOSPADM

## 2024-08-28 RX ORDER — POLYETHYLENE GLYCOL 3350 17 G/17G
17 POWDER, FOR SOLUTION ORAL DAILY PRN
Status: DISCONTINUED | OUTPATIENT
Start: 2024-08-28 | End: 2024-09-01 | Stop reason: HOSPADM

## 2024-08-28 RX ORDER — ALBUMIN (HUMAN) 12.5 G/50ML
25 SOLUTION INTRAVENOUS EVERY 8 HOURS
Status: DISCONTINUED | OUTPATIENT
Start: 2024-08-28 | End: 2024-08-29 | Stop reason: DRUGHIGH

## 2024-08-28 RX ORDER — SODIUM CHLORIDE 0.9 % (FLUSH) 0.9 %
5-40 SYRINGE (ML) INJECTION PRN
Status: DISCONTINUED | OUTPATIENT
Start: 2024-08-28 | End: 2024-09-01 | Stop reason: HOSPADM

## 2024-08-28 RX ADMIN — ALBUMIN (HUMAN) 25 G: 0.25 INJECTION, SOLUTION INTRAVENOUS at 18:44

## 2024-08-28 RX ADMIN — SODIUM CHLORIDE 500 ML: 9 INJECTION, SOLUTION INTRAVENOUS at 01:13

## 2024-08-28 RX ADMIN — WATER 1000 MG: 1 INJECTION INTRAMUSCULAR; INTRAVENOUS; SUBCUTANEOUS at 04:34

## 2024-08-28 RX ADMIN — SODIUM CHLORIDE 1000 ML: 9 INJECTION, SOLUTION INTRAVENOUS at 06:09

## 2024-08-28 RX ADMIN — SODIUM CHLORIDE, PRESERVATIVE FREE 10 ML: 5 INJECTION INTRAVENOUS at 21:42

## 2024-08-28 RX ADMIN — HEPARIN SODIUM 5000 UNITS: 5000 INJECTION INTRAVENOUS; SUBCUTANEOUS at 15:44

## 2024-08-28 ASSESSMENT — PAIN SCALES - GENERAL
PAINLEVEL_OUTOF10: 0

## 2024-08-28 NOTE — ED TRIAGE NOTES
Patient brought to room via wheelchair. Patient reports bilateral Flank pain for past 3 days. Patient seen PCP this past week and was placed on Ceftin for a UTI and has been taking them twice a day but has started having diarrhea and she stopped taking them.

## 2024-08-28 NOTE — CARE COORDINATION
08/28/24 1529   Service Assessment   Patient Orientation Alert and Oriented   Cognition Alert   History Provided By Patient   Primary Caregiver Self   Accompanied By/Relationship Daughter Wen and son Jesus   Support Systems Children;Family Members   Patient's Healthcare Decision Maker is: Legal Next of Kin   PCP Verified by CM Yes   Last Visit to PCP Within last 3 months   Prior Functional Level Independent in ADLs/IADLs   Current Functional Level Assistance with the following:;Housework;Shopping;Mobility   Can patient return to prior living arrangement Unknown at present   Ability to make needs known: Good   Family able to assist with home care needs: Yes   Would you like for me to discuss the discharge plan with any other family members/significant others, and if so, who? Yes  (My children)   Financial Resources Medicare;Medicaid   Community Resources None   Social/Functional History   Lives With Family   Type of Home House   Home Layout One level   Home Access Stairs to enter with rails   Entrance Stairs - Number of Steps 4   Entrance Stairs - Rails Both   Bathroom Shower/Tub Tub/Shower unit   Bathroom Toilet Standard   Bathroom Equipment None   Bathroom Accessibility Accessible   Home Equipment Walker - Rolling   Receives Help From Family   ADL Assistance Independent   Homemaking Assistance Independent   Homemaking Responsibilities Yes   Ambulation Assistance Needs assistance   Transfer Assistance Independent   Active  Yes   Mode of Transportation Car   Occupation Retired   Discharge Planning   Type of Residence Skilled Nursing Facility   Living Arrangements Family Members   Current Services Prior To Admission None   Potential Assistance Needed N/A   DME Ordered? No   Potential Assistance Purchasing Medications No   Type of Home Care Services None   Patient expects to be discharged to: Unknown   One/Two Story Residence One story   History of falls? 0   Services At/After Discharge   Transition of

## 2024-08-28 NOTE — PROGRESS NOTES
[facility]      Patient: Yolie Limon Age: 79 y.o.   Date/Time: 8/28/2024    Yolie Limon was admitted to [facility] [id number]. Ms Wen Limon was at the bedside on the day of admission.       Signed By: Gisell Martinez MD     August 28, 2024 4:07 PM

## 2024-08-28 NOTE — ED PROVIDER NOTES
EMERGENCY DEPARTMENT HISTORY AND PHYSICAL EXAM    Date: 8/27/2024  Patient Name: Yolie Limon    History of Presenting Illness     Chief Complaint   Patient presents with    Flank Pain     Bilateral         History Provided By: Patient and Patient's Daughter    Additional History (Context):   1:05 AM EDT  Yolie Limon is a 79 y.o. female with PMHX of hypertension, arthritis, menopause, high cholesterol status post appendectomy status post hysterectomy who presents to the emergency department C/O abdominal pain flank pain.  Patient comes in with several days of fatigue malaise totaling 3 days.  Patient states he was seen originally by primary care doctor last week was started on Ceftin for an antibiotic after urinary infection.  Looking back patient has also been followed by left-sided adrenal cyst.  I asked about abdominal surgeries including hysterectomy and she responds they took both ovaries as well as the uterus.  I review some of her chart records mention partial hysterectomy others hysterectomy with bilateral oophorectomy.  She has a jaundice appearance to her eyes denies any history of gallbladder problems pancreas problems liver problems, no history of hepatitis (patient already had her gallbladder removed) or alcohol use or abuse.  She also denies exotic travel.  She denies excessive use of Tylenol  That said there were 2 imaging studies in October 2022 for bloody urine and UTI also for groin swelling and adnexal pain.  Renal ultrasound from October 22 demonstrates no hydronephrosis or renal calculus along with no solid renal mass identified.  Also during the same visit October 2022 ultrasound of the pelvis complete demonstrates surgically absent uterus as well as right ovary and adnexa surgically absent while the left ovary and adnexa show a left-sided adrenal cyst measuring 2.6 x 2.7 cm and unchanged compared to previous studies.    Social History  She denies smoking drinking or

## 2024-08-28 NOTE — PROGRESS NOTES
4 Eyes Skin Assessment     NAME:  Yolie Limon  YOB: 1945  MEDICAL RECORD NUMBER:  [id number]    The patient is being assessed for  Admission    I agree that at least one RN has performed a thorough Head to Toe Skin Assessment on the patient. ALL assessment sites listed below have been assessed.      Areas assessed by both nurses:    Head, Face, Ears, Shoulders, Back, Chest, Arms, Elbows, Hands, Sacrum. Buttock, Coccyx, Ischium, and Legs. Feet and Heels        Does the Patient have a Wound? No noted wound(s)       Michele Prevention initiated by RN: Yes  Wound Care Orders initiated by RN: No    Pressure Injury (Stage 3,4, Unstageable, DTI, NWPT, and Complex wounds) if present, place Wound referral order by RN under : No    New Ostomies, if present place, Ostomy referral order under : No     Nurse 1 eSignature: Electronically signed by Lorie Storey RN on 8/28/24 at 6:57 PM EDT    **SHARE this note so that the co-signing nurse can place an eSignature**    Nurse 2 eSignature: Electronically signed by Shannan Gonsalez RN on 8/28/24 at 6:57 PM EDT

## 2024-08-28 NOTE — CONSULTS
INTERVENTIONAL RADIOLOGY CONSULT                  Yolie Limon is a 79 y.o. female who is being seen at the request of Gisell Reeves MD  for liver masses concerning for metastatic disease.    Indication/CC: Liver masses concerning for metastatic disease    Impression:     Patient Active Problem List    Diagnosis Date Noted    LUNA (acute kidney injury) (HCC) 08/28/2024         Plan:   1.  Will add a scheduled for tomorrow 8/29/2024 schedule hours for liver mass biopsy  2.  Consents for procedure  and sedation obtained    NPO: After midnight tonight    Blood Thinners: hold SQ heparin    Coag/Plt count: Labs reviewed and okay to proceed    History of present illness: Patient past medical history of high blood pressure arthritis menopause high cholesterol status post hysterectomy and bilateral oophorectomy presenting to the hospital after 3-day history of increasing dull aching down nondescript abdominal pain with generalized malaise and fatigue without nausea and vomiting.  Patient initially thought that she had a UTI and was being treated for antibiotics but patient's pain persisted as well as increasing generalized malaise and fatigue.  Patient presented to hospital CT scan was performed that showed multiple liver masses concerning for metastatic disease and unknown etiology patient will need liver mass biopsy for histologic diagnosis and staging to guide future treatment planning and goals of care discussions      Imaging: CT scan reviewed    Past Medical History:  Past Medical History:   Diagnosis Date    Arthritis     Breast cyst     High cholesterol     Hypertension     Menopause        Medications:   Prior to Admission medications    Medication Sig Start Date End Date Taking? Authorizing Provider   cefUROXime (CEFTIN) 500 MG tablet Take 1 tablet by mouth 2 times daily For 10 days   Yes Provider, MD Radha   cyclobenzaprine (FLEXERIL) 10 MG tablet Take 1 tablet by mouth 3 times daily  as needed   Yes Automatic Reconciliation, Ar   hydroCHLOROthiazide (HYDRODIURIL) 12.5 MG tablet Take 1 tablet by mouth daily   Yes Automatic Reconciliation, Ar   losartan (COZAAR) 50 MG tablet Take 1 tablet by mouth daily   Yes Automatic Reconciliation, Ar   meloxicam (MOBIC) 15 MG tablet Take 1 tablet by mouth daily   Yes Automatic Reconciliation, Ar   pravastatin (PRAVACHOL) 20 MG tablet Take 1 tablet by mouth   Yes Automatic Reconciliation, Ar   Biotin 2.5 MG CAPS Take 1,000 mcg by mouth    Automatic Reconciliation, Ar   cyanocobalamin 500 MCG tablet Take 500 mcg by mouth daily    Automatic Reconciliation, Ar   folic acid (FOLVITE) 1 MG tablet Take 1 mg by mouth daily    Automatic Reconciliation, Ar   loratadine (CLARITIN) 10 MG tablet Take 10 mg by mouth  Patient not taking: Reported on 8/28/2024    Automatic Reconciliation, Ar   sertraline (ZOLOFT) 50 MG tablet Take 50 mg by mouth daily  Patient not taking: Reported on 8/28/2024    Automatic Reconciliation, Ar       Allergies:  No Known Allergies    Social History:  Social History     Socioeconomic History    Marital status: Single     Spouse name: Not on file    Number of children: Not on file    Years of education: Not on file    Highest education level: Not on file   Occupational History    Not on file   Tobacco Use    Smoking status: Never    Smokeless tobacco: Never   Substance and Sexual Activity    Alcohol use: Never    Drug use: Never    Sexual activity: Not on file   Other Topics Concern    Not on file   Social History Narrative    Not on file     Social Determinants of Health     Financial Resource Strain: Not on file   Food Insecurity: No Food Insecurity (8/28/2024)    Hunger Vital Sign     Worried About Running Out of Food in the Last Year: Never true     Ran Out of Food in the Last Year: Never true   Transportation Needs: No Transportation Needs (8/28/2024)    PRAPARE - Transportation     Lack of Transportation (Medical): No     Lack of

## 2024-08-28 NOTE — H&P
History & Physical    Patient: Yolie Limon MRN: [id number]  [facility]: [id number]    YOB: 1945  Age: 79 y.o.  Sex: female      DOA: 8/28/2024      Cc: flank pain.        HPI:     Yolie Limon is a 79 y.o. female with past medical hx of dyslipidemia, hypertension, who presented to [facility] with right flank pain which started 3 days ago.  Patient states it felt like a \"sting.\"  Pain was rated 2 out of 10, improved to 0 out of 10.  Alleviated with drinking ice water, denies aggravating factors.  Pain was nonradiating, she denies associated dysuria, hematuria.  When the pain came on, she experienced nausea and vomiting, nonbloody.  She had nonbloody diarrhea as well, which she attributes to recent use of antibiotic which she was prescribed for urinary tract infection.  At [place], patient has CT chest abdomen and pelvis which revealed hepatomegaly with numerous subtle hypodense masses, concerning for diffuse metastatic involvement, and a 3.4 x 2.9 cm left adnexal mass.  Also few 2 to 3 mm right lung nodules.  Patient was transferred to [facility] for further management.    Patient reports she wishes to pursue diagnosis of the lesions.  Her daughter Wen at bedside reports patient has been feeling off balance, and she fell when coming down the stairs about a month ago.  Patient walks with a cane at baseline.  Daughter reports patient had an MRI brain in [place] which was reassuring, these results were not visible in care everywhere however.    Patient's son Jesus is also present at the bedside.  Patient denies personal use of tobacco, secondhand smoke exposure, alcohol, illicits.  She is a retired domestic worker, worked with cleaning houses, and resides at home with her mom.      Past Medical History:   Diagnosis Date    Arthritis     Breast cyst     High cholesterol     Hypertension     Menopause        Past Surgical History:   Procedure Laterality Date     mmol/L    Chloride 105 100 - 111 mmol/L    CO2 20 (L) 21 - 32 mmol/L    Anion Gap 12 3.0 - 18.0 mmol/L    Glucose 94 74 - 99 mg/dL    BUN 91 (H) 7 - 18 mg/dL    Creatinine 3.44 (H) 0.60 - 1.30 mg/dL    BUN/Creatinine Ratio 26 (H) 12 - 20      Est, Glom Filt Rate 13 (L) >60 ml/min/1.73m2    Calcium 10.9 (H) 8.5 - 10.1 mg/dL    Total Bilirubin 18.0 (H) 0.2 - 1.0 mg/dL     (H) 10 - 38 U/L     (H) 13 - 56 U/L    Alk Phosphatase 390 (H) 45 - 117 U/L    Total Protein 8.5 (H) 6.4 - 8.2 g/dL    Albumin 3.1 (L) 3.4 - 5.0 g/dL    Globulin 5.4 (H) 2.0 - 4.0 g/dL    Albumin/Globulin Ratio 0.6 (L) 0.8 - 1.7     Lipase    Collection Time: 08/28/24  1:00 AM   Result Value Ref Range    Lipase 140 (H) 13 - 75 U/L   Protime-INR    Collection Time: 08/28/24  1:00 AM   Result Value Ref Range    Protime 17.8 (H) 11.9 - 14.9 sec    INR 1.5 (H) 0.9 - 1.1     APTT    Collection Time: 08/28/24  1:00 AM   Result Value Ref Range    APTT 30.6 23.0 - 36.4 sec    Therapeutic Range   73 - 110 sec   Urinalysis    Collection Time: 08/28/24  6:50 AM   Result Value Ref Range    Color, UA Dark Yellow      Appearance Turbid      Specific Gravity, UA 1.018 1.005 - 1.030      pH, Urine 5.0 5.0 - 8.0      Protein,  (A) Negative mg/dL    Glucose, Ur Negative Negative mg/dL    Ketones, Urine Negative Negative mg/dL    Bilirubin, Urine Large (A) Negative      Blood, Urine Negative Negative      Urobilinogen, Urine 1.0 0.2 - 1.0 EU/dL    Nitrite, Urine Positive (A) Negative      Leukocyte Esterase, Urine Large (A) Negative     Urinalysis, Micro    Collection Time: 08/28/24  6:50 AM   Result Value Ref Range    WBC, UA 20-50 0 - 4 /hpf    RBC, UA 5-10 0 - 2 /hpf    Epithelial Cells, UA Moderate 0 - 20 /lpf    BACTERIA, URINE 3+ (A) Negative /hpf       Procedures/imaging: see electronic medical records for all procedures/Xrays and details which were not copied into this note but were reviewed prior to creation of Plan        Assessment/Plan

## 2024-08-28 NOTE — CONSULTS
Consult Note  Consult requested by: Dr. Michelle Garzamaría elena Limon is a 79 y.o. female Black /  who is being seen on consult for renal failure  No chief complaint on file.    Admission diagnosis: LUNA (acute kidney injury) (HCC)     79-year-old female with past medical history of hypertensions, arthritis came to the ER with abdominal pain, following for renal failure  Impression & Plan:   IMPRESSION:   Elevated creatinine, acute versus chronic, suspect acute kidney injury  , likely tubular injury from prerenal LUNA versus hepatorenal injury, risk factors diarrhea, on hydrochlorothiazide, on losartan and meloxicam per medication list   Sepsis, leukocytosis, abnormal urinalysis  Elevated LFT, hypercoagulability, higher total bilirubin, suspect hepatic metastasis on CT scan  Hypertension   PLAN:   Order albumin or next 24 to 48 hours, monitor urine output and renal function.  Hold ARB, meloxicam.  Check urine protein study, urine sodium and serum PTH.  Monitor for urine retention.   Adjust medication per current renal function status.       HPI: 79-year-old female with past medical history of hypertensions, arthritis came to the ER with abdominal pain and flank pain.  She was recently started on antibiotic for UTI.    In the emergency room she is afebrile, not hypotensive, her workup shows leukocytosis, severe elevated LFT and hypercoagulability and renal failure.  Her CT abdomen shows higher suspicion of liver metastasis  She was transferred from [place] to [facility].  She is admitted on the floor for further management.  Nephrology service consulted for renal failure.    She is not able to provide any detailed history but family is at bedside.  [place] her daughters lives with her.    I reviewed her medication she is taking meloxicam over the last several week.  Her medication also includes hydrochlorothiazide and losartan.    She tells me that she has not

## 2024-08-29 PROBLEM — Z51.5 PALLIATIVE CARE ENCOUNTER: Status: ACTIVE | Noted: 2024-08-29

## 2024-08-29 PROBLEM — Z71.89 DNR (DO NOT RESUSCITATE) DISCUSSION: Status: ACTIVE | Noted: 2024-08-29

## 2024-08-29 PROBLEM — Z71.89 ACP (ADVANCE CARE PLANNING): Status: ACTIVE | Noted: 2024-08-29

## 2024-08-29 PROBLEM — Z71.89 GOALS OF CARE, COUNSELING/DISCUSSION: Status: ACTIVE | Noted: 2024-08-29

## 2024-08-29 PROBLEM — R16.0 LIVER MASSES: Status: ACTIVE | Noted: 2024-08-29

## 2024-08-29 LAB
ALBUMIN SERPL-MCNC: 3.7 G/DL (ref 3.4–5)
ALBUMIN/GLOB SERPL: 1 (ref 0.8–1.7)
ALP SERPL-CCNC: 267 U/L (ref 45–117)
ALT SERPL-CCNC: 139 U/L (ref 13–56)
ANION GAP SERPL CALC-SCNC: 9 MMOL/L (ref 3–18)
AST SERPL-CCNC: 399 U/L (ref 10–38)
BASOPHILS # BLD: 0 K/UL (ref 0–0.1)
BASOPHILS NFR BLD: 0 % (ref 0–2)
BILIRUB DIRECT SERPL-MCNC: 13.7 MG/DL (ref 0–0.2)
BILIRUB SERPL-MCNC: 18.4 MG/DL (ref 0.2–1)
BUN SERPL-MCNC: 79 MG/DL (ref 7–18)
BUN/CREAT SERPL: 34 (ref 12–20)
CALCIUM SERPL-MCNC: 10.7 MG/DL (ref 8.5–10.1)
CALCIUM SERPL-MCNC: 10.8 MG/DL (ref 8.5–10.1)
CHLORIDE SERPL-SCNC: 109 MMOL/L (ref 100–111)
CO2 SERPL-SCNC: 19 MMOL/L (ref 21–32)
CREAT SERPL-MCNC: 2.32 MG/DL (ref 0.6–1.3)
DIFFERENTIAL METHOD BLD: ABNORMAL
EOSINOPHIL # BLD: 0 K/UL (ref 0–0.4)
EOSINOPHIL NFR BLD: 0 % (ref 0–5)
ERYTHROCYTE [DISTWIDTH] IN BLOOD BY AUTOMATED COUNT: 16.5 % (ref 11.6–14.5)
FERRITIN SERPL-MCNC: 320 NG/ML (ref 8–388)
FOLATE SERPL-MCNC: >20 NG/ML (ref 3.1–17.5)
GLOBULIN SER CALC-MCNC: 3.6 G/DL (ref 2–4)
GLUCOSE SERPL-MCNC: 74 MG/DL (ref 74–99)
HCT VFR BLD AUTO: 27.7 % (ref 35–45)
HGB BLD-MCNC: 10.7 G/DL (ref 12–16)
IMM GRANULOCYTES # BLD AUTO: 0 K/UL
IMM GRANULOCYTES NFR BLD AUTO: 0 %
INR PPP: 1.8 (ref 0.9–1.1)
IRON SATN MFR SERPL: 27 % (ref 20–50)
IRON SERPL-MCNC: 46 UG/DL (ref 50–175)
LYMPHOCYTES # BLD: 2.8 K/UL (ref 0.9–3.6)
LYMPHOCYTES NFR BLD: 14 % (ref 21–52)
MCH RBC QN AUTO: 28.5 PG (ref 24–34)
MCHC RBC AUTO-ENTMCNC: 37.9 G/DL (ref 31–37)
MCV RBC AUTO: 75.5 FL (ref 78–100)
MONOCYTES # BLD: 0.8 K/UL (ref 0.05–1.2)
MONOCYTES NFR BLD: 4 % (ref 3–10)
NEUTS BAND NFR BLD MANUAL: 2 % (ref 0–5)
NEUTS SEG # BLD: 16.4 K/UL (ref 1.8–8)
NEUTS SEG NFR BLD: 80 % (ref 40–73)
NRBC # BLD: 0.1 K/UL (ref 0–0.01)
NRBC BLD-RTO: 0.5 PER 100 WBC
PHOSPHATE SERPL-MCNC: 3 MG/DL (ref 2.5–4.9)
PLATELET # BLD AUTO: 238 K/UL (ref 135–420)
PLATELET COMMENT: ABNORMAL
PMV BLD AUTO: 10.7 FL (ref 9.2–11.8)
POTASSIUM SERPL-SCNC: 5 MMOL/L (ref 3.5–5.5)
PROT SERPL-MCNC: 7.3 G/DL (ref 6.4–8.2)
PROTHROMBIN TIME: 20.7 SEC (ref 11.9–14.9)
PTH-INTACT SERPL-MCNC: 81.3 PG/ML (ref 18.4–88)
RBC # BLD AUTO: 3.67 M/UL (ref 4.2–5.3)
RBC MORPH BLD: ABNORMAL
SODIUM SERPL-SCNC: 137 MMOL/L (ref 136–145)
SODIUM UR-SCNC: 41 MMOL/L (ref 20–110)
TIBC SERPL-MCNC: 169 UG/DL (ref 250–450)
VIT B12 SERPL-MCNC: >2000 PG/ML (ref 211–911)
WBC # BLD AUTO: 20 K/UL (ref 4.6–13.2)

## 2024-08-29 PROCEDURE — 1100000000 HC RM PRIVATE

## 2024-08-29 PROCEDURE — 87086 URINE CULTURE/COLONY COUNT: CPT

## 2024-08-29 PROCEDURE — 85025 COMPLETE CBC W/AUTO DIFF WBC: CPT

## 2024-08-29 PROCEDURE — 6360000002 HC RX W HCPCS: Performed by: PHYSICIAN ASSISTANT

## 2024-08-29 PROCEDURE — 99232 SBSQ HOSP IP/OBS MODERATE 35: CPT | Performed by: HOSPITALIST

## 2024-08-29 PROCEDURE — 97166 OT EVAL MOD COMPLEX 45 MIN: CPT

## 2024-08-29 PROCEDURE — 2580000003 HC RX 258: Performed by: HOSPITALIST

## 2024-08-29 PROCEDURE — 83550 IRON BINDING TEST: CPT

## 2024-08-29 PROCEDURE — 80076 HEPATIC FUNCTION PANEL: CPT

## 2024-08-29 PROCEDURE — 82746 ASSAY OF FOLIC ACID SERUM: CPT

## 2024-08-29 PROCEDURE — 99222 1ST HOSP IP/OBS MODERATE 55: CPT | Performed by: INTERNAL MEDICINE

## 2024-08-29 PROCEDURE — 84100 ASSAY OF PHOSPHORUS: CPT

## 2024-08-29 PROCEDURE — 6370000000 HC RX 637 (ALT 250 FOR IP): Performed by: HOSPITALIST

## 2024-08-29 PROCEDURE — 84300 ASSAY OF URINE SODIUM: CPT

## 2024-08-29 PROCEDURE — 97535 SELF CARE MNGMENT TRAINING: CPT

## 2024-08-29 PROCEDURE — P9047 ALBUMIN (HUMAN), 25%, 50ML: HCPCS | Performed by: INTERNAL MEDICINE

## 2024-08-29 PROCEDURE — 80048 BASIC METABOLIC PNL TOTAL CA: CPT

## 2024-08-29 PROCEDURE — 83970 ASSAY OF PARATHORMONE: CPT

## 2024-08-29 PROCEDURE — 36415 COLL VENOUS BLD VENIPUNCTURE: CPT

## 2024-08-29 PROCEDURE — 6360000002 HC RX W HCPCS: Performed by: INTERNAL MEDICINE

## 2024-08-29 PROCEDURE — 87088 URINE BACTERIA CULTURE: CPT

## 2024-08-29 PROCEDURE — 97530 THERAPEUTIC ACTIVITIES: CPT

## 2024-08-29 PROCEDURE — 83540 ASSAY OF IRON: CPT

## 2024-08-29 PROCEDURE — 97116 GAIT TRAINING THERAPY: CPT

## 2024-08-29 PROCEDURE — 82728 ASSAY OF FERRITIN: CPT

## 2024-08-29 PROCEDURE — 85610 PROTHROMBIN TIME: CPT

## 2024-08-29 PROCEDURE — 97161 PT EVAL LOW COMPLEX 20 MIN: CPT

## 2024-08-29 PROCEDURE — 87186 SC STD MICRODIL/AGAR DIL: CPT

## 2024-08-29 PROCEDURE — 82607 VITAMIN B-12: CPT

## 2024-08-29 RX ORDER — ALBUMIN (HUMAN) 12.5 G/50ML
25 SOLUTION INTRAVENOUS EVERY 12 HOURS
Status: COMPLETED | OUTPATIENT
Start: 2024-08-29 | End: 2024-08-30

## 2024-08-29 RX ADMIN — ALBUMIN (HUMAN) 25 G: 0.25 INJECTION, SOLUTION INTRAVENOUS at 22:57

## 2024-08-29 RX ADMIN — HEPARIN SODIUM 5000 UNITS: 5000 INJECTION INTRAVENOUS; SUBCUTANEOUS at 23:05

## 2024-08-29 RX ADMIN — ALBUMIN (HUMAN) 25 G: 0.25 INJECTION, SOLUTION INTRAVENOUS at 01:58

## 2024-08-29 RX ADMIN — SODIUM CHLORIDE, PRESERVATIVE FREE 10 ML: 5 INJECTION INTRAVENOUS at 11:13

## 2024-08-29 RX ADMIN — ALBUMIN (HUMAN) 25 G: 0.25 INJECTION, SOLUTION INTRAVENOUS at 11:21

## 2024-08-29 RX ADMIN — HEPARIN SODIUM 5000 UNITS: 5000 INJECTION INTRAVENOUS; SUBCUTANEOUS at 15:25

## 2024-08-29 RX ADMIN — CYANOCOBALAMIN TAB 500 MCG 500 MCG: 500 TAB at 11:10

## 2024-08-29 RX ADMIN — FOLIC ACID 1 MG: 1 TABLET ORAL at 11:10

## 2024-08-29 RX ADMIN — SODIUM CHLORIDE, PRESERVATIVE FREE 10 ML: 5 INJECTION INTRAVENOUS at 23:06

## 2024-08-29 ASSESSMENT — PAIN SCALES - GENERAL
PAINLEVEL_OUTOF10: 0

## 2024-08-29 NOTE — PROGRESS NOTES
Interventional Radiology    Regarding liver mass biopsy initially planned for today:    Due to procedure staff shortages we are unable to accommodate add on biopsy cases today. We will plan for tomorrow.     OK to resume diet today from IR standpoint.   NPO at MN and hold SQ heparin 8/30 for anticipated procedure.    Thank you,  RASHAD Birch  [phone]

## 2024-08-29 NOTE — PROGRESS NOTES
Spiritual Health Assessment/Progress Note  [facility]    Initial Encounter,  ,  ,      Name: Yolie Limon MRN: [id number]    Age: 79 y.o.     Sex: female   Language: English   Christianity: Tenriism   LUNA (acute kidney injury) (HCC)     Date: 8/29/2024            Total Time Calculated: 6 min              Spiritual Assessment began in [facility] [place] [facility]        Referral/Consult From: Rounding   Encounter Overview/Reason: Initial Encounter  Service Provided For: Patient    Leticia, Belief, Meaning:   Patient identifies as spiritual, is connected with a leticia tradition or spiritual practice, and has beliefs or practices that help with coping during difficult times  Family/Friends No family/friends present      Importance and Influence:  Patient has spiritual/personal beliefs that influence decisions regarding their health  Family/Friends no family/friends present    Community:  Patient is connected with a spiritual community and feels well-supported. Support system includes: Children  Family/Friends Other: n/a    Assessment and Plan of Care:     Patient Interventions include: Affirmed coping skills/support systems and Engaged in life review and/or legacy  Family/Friends Interventions include: Other: n/a    Patient Plan of Care: Spiritual Care available upon further referral  Family/Friends Plan of Care: Other: n/a    Electronically signed by Chaplain Ana on 8/29/2024 at 1:25 PM

## 2024-08-29 NOTE — PROGRESS NOTES
conducted an initial consultation and Spiritual Assessment for Yolie Limon, who is a 79 y.o.,female. Patient's Primary Language is: English.   According to the patient's EMR Scientology Affiliation is: Cheondoism.     The reason the Patient came to the hospital is:   Patient Active Problem List    Diagnosis Date Noted    LUNA (acute kidney injury) (HCC) 08/28/2024        The  provided the following Interventions:  Initiated a relationship of care and support.   Explored issues of nelly, belief, spirituality and Congregation/ritual needs while hospitalized.  Listened empathically.  Provided chaplaincy education concerning Advance Medical Directive.  Provided information about Spiritual Care Services.  Offered prayer and assurance of continued prayers on patient's behalf.   Chart reviewed.    The following outcomes where achieved:  Patient shared limited information about both their medical narrative and spiritual journey/beliefs.   confirmed Patient's Scientology Affiliation.  Patient processed feeling about current hospitalization.  Patient expressed gratitude for 's visit.    Assessment:  Patient does not have any Congregation/cultural needs that will affect patient's preferences in health care.  There are no spiritual or Congregation issues which require intervention at this time.     Plan:  Chaplains will continue to follow and will provide pastoral care on an as needed/requested basis.   recommends bedside caregivers page  on duty if patient shows signs of acute spiritual or emotional distress.     Prakash Adams   Staff   Spiritual Health  [phone]

## 2024-08-29 NOTE — PLAN OF CARE
Problem: Physical Therapy - Adult  Goal: By Discharge: Performs mobility at highest level of function for planned discharge setting.  See evaluation for individualized goals.  Description: Initiated  8/29/24  to be met within 7-10 days.    1.  Patient will move from supine to sit and sit to supine , scoot up and down, and roll side to side in bed with modified independence.    2.  Patient will transfer from bed to chair and chair to bed with modified independence using the least restrictive device.  3.  Patient will perform sit to stand with modified independence.  4.  Patient will ambulate with modified independence for 50 feet with the least restrictive device for improved safety with home ambulation.       PLOF: Pt lives with her Mother and is her primary caregiver, single story home with 4 KATYA however, getting a ramp.  Pt was independent with all mobility with use of SPC, has RW.    Outcome: Progressing     PHYSICAL THERAPY EVALUATION    Patient: oYlie Limon (79 y.o. female)  Date: 8/29/2024  Primary Diagnosis: LUNA (acute kidney injury) (HCC) [N17.9]       Precautions: Fall Risk, General Precautions,  ,  ,  ,  ,  ,  ,      ASSESSMENT :  Pt resting in bed upon entering room, agreeable to PT evaluation.  Pt performed supine to sit transfer with SBA, good sitting balance.  Pt with b/l gross LE strength WFL.  STS transfer with RW and CGA, ambulated to restroom and then around room with CGA and no significant LOB with slow, shuffling gait.  Pt was agreeable to sit up in recliner, pt requested to brush her teeth and wash her face and was able to perform all tasks independently.  Pt educated on need to call RN if needing to get up to use the restroom or wanting to return back to bed, pt verbalized understanding.  Pt left with all needs met, tray table in front of her and call bell within reach.  Pt would continue to benefit from skilled PT services while in acute care setting to address deficits listed below

## 2024-08-29 NOTE — CONSULTS
Palliative Medicine Consult  [facility]: [phone] ([phone]  [facility]: [phone]     Patient Name: Yolie Limon  YOB: 1945    Date of Service: 8/29/2024  Provider: Joaquin Castañeda MD  Hospital Day: 2  Admit Date: 8/28/2024  Referring Provider: Dr. Martinez    Reason for Consult: goals of care discussion/ACP/symptoms management      SUMMARY:     Yolie Limon is a 79 y.o. with a past history of dyslipidemia, hypertension, who was admitted on 8/28/2024 from [facility] with a diagnosis of hepatic mass and LUNA.  Patient initially presented to [facility] with complaint of right flank pain.  Patient also had diarrhea.  CT scan abdomen pelvis showed hepatomegaly with multiple masses, left adnexal masses and right lung nodule.  Patient was subsequently transferred here for further management.  Patient is being followed by interventional neurology and nephrology service.  Pending oncology consult.  Current medical issues leading to Palliative Medicine involvement include: To establish goals of care.    8/29/24: Patient was seen in presence of palliative care staff member.  Patient is awake and able to answer questions appropriately.  Denies for pain.  Feeling tired.  No headaches or dizziness.  No nausea or vomiting.  No shortness of breath or cough.     HISTORY:     History obtained from: Patient and medical records    CHIEF COMPLAINT: Transferred here for further evaluation and management of hepatic mass from outside hospital    HPI/SUBJECTIVE:    The patient is:   [x] Verbal and participatory  [] Non-participatory due to:         PHYSICAL EXAM:     From RN flowsheet:  Wt Readings from Last 3 Encounters:   08/28/24 77.3 kg (170 lb 8 oz)   08/27/24 77.6 kg (171 lb)   04/29/24 83.9 kg (185 lb)       /75   Pulse 89   Temp 97.8 °F (36.6 °C) (Oral)   Resp 23   Ht 1.7 m (5' 6.93\")   Wt 77.3 kg (170 lb 8 oz)   SpO2 97%   BMI 26.76 kg/m²  concerns:  [] Yes /  [x] No    Caregiver Burnout:  [] Yes /  [] No /  [x] No Caregiver Present      Anticipatory grief assessment:   [] Normal  / [] Maladaptive       ESAS Anxiety:      ESAS Depression: Depression Score: Not depressed      REVIEW OF SYSTEMS:     Positive and pertinent negative findings in ROS are noted above in HPI.  The following systems were [x] reviewed / [] unable to be reviewed as noted in HPI  Other findings are noted below.  Systems: constitutional, ears/nose/mouth/throat, respiratory, gastrointestinal, genitourinary, musculoskeletal, integumentary, neurologic, psychiatric, endocrine. Positive findings noted below.    Modified ESAS Completed by: provider                                              HISTORY:     Past Medical History:   Diagnosis Date    Arthritis     Breast cyst     High cholesterol     Hypertension     Menopause       Past Surgical History:   Procedure Laterality Date    APPENDECTOMY      CHOLECYSTECTOMY      HYSTERECTOMY (CERVIX STATUS UNKNOWN)      Partial      No family history on file.   History reviewed, no pertinent family history.  Social History     Tobacco Use    Smoking status: Never    Smokeless tobacco: Never   Substance Use Topics    Alcohol use: Never     No Known Allergies   Current Facility-Administered Medications   Medication Dose Route Frequency    albumin human 25% IV solution 25 g  25 g IntraVENous Q12H    vitamin B-12 (CYANOCOBALAMIN) tablet 500 mcg  500 mcg Oral Daily    folic acid (FOLVITE) tablet 1 mg  1 mg Oral Daily    sodium chloride flush 0.9 % injection 5-40 mL  5-40 mL IntraVENous 2 times per day    sodium chloride flush 0.9 % injection 5-40 mL  5-40 mL IntraVENous PRN    0.9 % sodium chloride infusion   IntraVENous PRN    heparin (porcine) injection 5,000 Units  5,000 Units SubCUTAneous 3 times per day    ondansetron (ZOFRAN-ODT) disintegrating tablet 4 mg  4 mg Oral Q8H PRN    Or    ondansetron (ZOFRAN) injection 4 mg  4 mg IntraVENous Q6H PRN

## 2024-08-29 NOTE — PROGRESS NOTES
Progress Note      79-year-old female with past medical history of hypertensions, arthritis came to the ER with abdominal pain, following for renal failure  Interim event     Overnight event noted  No accurate urine output document  Awaiting for biopsy      IMPRESSION:   Elevated creatinine, acute versus chronic, suspect acute kidney injury  , likely tubular injury from prerenal LUNA versus hepatorenal injury, risk factors diarrhea, on hydrochlorothiazide, on losartan and meloxicam per medication list   Sepsis, leukocytosis, abnormal urinalysis  Elevated LFT, hypercoagulability, higher total bilirubin, suspect hepatic metastasis on CT scan  Hypertension  Hypercalcemia,    PLAN:   Her BUN and creatinine gradually improved with albumin transfusion.  She still has persistent metabolic acidosis.  Plan to give additional dose of albumin today.  Again I suspect she has underlying chronic kidney disease.  Awaiting for  urine protein study, urine sodium and serum PTH.  Monitor calcium  Monitor for urine retention.   Adjust medication per current renal function status.             Current Facility-Administered Medications   Medication Dose Route Frequency    vitamin B-12 (CYANOCOBALAMIN) tablet 500 mcg  500 mcg Oral Daily    folic acid (FOLVITE) tablet 1 mg  1 mg Oral Daily    sodium chloride flush 0.9 % injection 5-40 mL  5-40 mL IntraVENous 2 times per day    sodium chloride flush 0.9 % injection 5-40 mL  5-40 mL IntraVENous PRN    0.9 % sodium chloride infusion   IntraVENous PRN    heparin (porcine) injection 5,000 Units  5,000 Units SubCUTAneous 3 times per day    ondansetron (ZOFRAN-ODT) disintegrating tablet 4 mg  4 mg Oral Q8H PRN    Or    ondansetron (ZOFRAN) injection 4 mg  4 mg IntraVENous Q6H PRN    polyethylene glycol (GLYCOLAX) packet 17 g  17 g Oral Daily PRN    acetaminophen (TYLENOL) tablet 500 mg  500 mg Oral Q8H PRN    albumin human 25% IV solution 25 g  25 g IntraVENous Q8H       Review of Systems:  this note but were reviewed prior to creation of Plan    Discussed with primary team      Crescencio Castañeda MD  August 29, 2024  [facility]  Office [phone]

## 2024-08-29 NOTE — PLAN OF CARE
;Decreased coordination;Decreased endurance;Decreased ADL status;Decreased sensation;Decreased posture;Decreased balance;Decreased ROM;Decreased strength;Decreased vision/visual deficit    Patient will benefit from skilled intervention to address the above impairments.  Patient's rehabilitation potential/Prognosis: Good.  Factors which may influence rehabilitation potential include:   []             None noted  []             Mental ability/status  [x]             Medical condition  []             Home/family situation and support systems  []             Safety awareness  []             Pain tolerance/management  []             Other:      PLAN :  Recommendations and Planned Interventions:   [x]               Self Care Training                  [x]      Therapeutic Activities  [x]               Functional Mobility Training   []      Cognitive Retraining  [x]               Therapeutic Exercises           [x]      Endurance Activities  [x]               Balance Training                    []      Neuromuscular Re-Education  []               Visual/Perceptual Training     [x]      Home Safety Training  [x]               Patient Education                   [x]      Family Training/Education  []               Other (comment):    Frequency/Duration: Patient will be followed by occupational therapy to address goals, 1-2 times per day/3-5 days per week to address goals.    Further Equipment Recommendations for Discharge: shower chair and rolling walker    AMPAC: -PAC Inpatient Daily Activity Raw Score: 18    Current research shows that an AM-PAC score of 18 or greater is associated with a discharge to the patient's home setting.    This AMPAC score should be considered in conjunction with interdisciplinary team recommendations to determine the most appropriate discharge setting. Patient's social support, diagnosis, medical stability, and prior level of function should also be taken into consideration.     SUBJECTIVE:  Training  Bed Mobility Training: No  Supine to Sit: Stand-by assistance  Scooting: Stand-by assistance  Transfers:                 Transfer Training  Transfer Training: No  Overall Level of Assistance: Contact-guard assistance  Sit to Stand: Contact-guard assistance  Stand to Sit: Contact-guard assistance    ADL Assessment:   Feeding: Setup;Stand by assistance  Grooming: Contact guard assistance  UE Bathing: Moderate assistance  LE Bathing: Dependent/Total  UE Dressing: Moderate assistance  LE Dressing: Dependent/Total  Toileting: Dependent/Total       Pain:  Intensity Pre-treatment: 0/10   Intensity Post-treatment: 0/10      Activity Tolerance:   Activity Tolerance: Patient Tolerated treatment well  Please refer to the flowsheet for vital signs taken during this treatment.    After treatment:   [x] Patient left in no apparent distress sitting up in chair  [] Patient left in no apparent distress in bed  [x] Call bell left within reach  [x] Nursing notified  [] Caregiver present  [] Bed alarm activated    COMMUNICATION/EDUCATION:        Thank you for this referral.  Fanny Julian OT  Minutes: 20    Eval Complexity: Decision Making: Medium Complexity

## 2024-08-29 NOTE — PROGRESS NOTES
[facility]  Progress Note    Patient: Yolie Limon Age: 79 y.o. : 1945 MR#: [id number] SSN: [id number]  Date/Time: 2024    Subjective:     Got albumin yesterday.     Creatinine improved to 2.32. wbc improved to 20.0.    IR plans biopsy tomorrow.     Patient seen and examined at bedside, she reports she overall feels better than yesterday.  Her energy level is slightly improved.  Denies chest pain, abdominal pain.  No family at bedside.    Assessment/Plan:     1. Liver lesions, metastatic dz suspected. interventional radiology to do biopsy tomorrow.  oncology consult.   2. LUNA, concern for hepatorenal syndrome.  No hydronephrosis on CT.  Nephrology consult.  3. Recent UTI. Urine cx ordered.   4. Elevated LFTs, likely related to metastatic disease - trend. .  5. 3.4 x 2.9 cm left adnexal mass.   6. Few 2-3 mm right lung nodules.   7.  Baseline functional status: Walks with cane.  8.  Nonanion gap metabolic acidosis, in the setting of LUNA.  9.  Leukocytosis POA, no evidence of infection.  Please follow urine culture.  10.  Anemia, microcytic. iron studies B12 noted.   11.  Hold chemical DVT prophylaxis in anticipation of biopsy  12.  Full code. Pt /ot.     Additional Notes:      Case discussed with:  [x]patient  []family  [x]nursing  []case management   [] discussed on IDT.   DVT Prophylaxis:  []Lovenox  []Hep SQ  []SCDs  []Coumadin   []On Heparin gtt   [] noac    Objective:   VS: /78   Pulse 83   Temp 97.5 °F (36.4 °C) (Oral)   Resp 18   Ht 1.7 m (5' 6.93\")   Wt 77.3 kg (170 lb 8 oz)   SpO2 98%   BMI 26.76 kg/m²    Tmax/24hrs: Temp (24hrs), Av.9 °F (36.6 °C), Min:97.5 °F (36.4 °C), Max:98.6 °F (37 °C)    Input/Output:   Intake/Output Summary (Last 24 hours) at 2024 0925  Last data filed at 2024 0816  Gross per 24 hour   Intake 0 ml   Output 200 ml   Net -200 ml       General:  Awake alert and oriented x4. No acute distress.  Appears stated age.   HEENT:  Normocephalic, without obvious abnormality, atraumatic.   Cardiovascular:  Regular rate and rhythm, S1, S2 normal, no murmur, rub or gallop.   Pulmonary:  Clear to auscultation bilaterally.   GI:  Soft, non-tender. Non-distended. Normoactive bowel sounds.   Extremities:  No clubbing, cyanosis or edema.  2+ and symmetric all extremities.  Neuro:  CNII-XII intact. No focal motor or sensory deficit.   Skin:  No rashes or lesions to visible skin    Labs:    Recent Results (from the past 24 hour(s))   Hepatic Function Panel    Collection Time: 08/29/24  5:22 AM   Result Value Ref Range    Total Protein 7.3 6.4 - 8.2 g/dL    Albumin 3.7 3.4 - 5.0 g/dL    Globulin 3.6 2.0 - 4.0 g/dL    Albumin/Globulin Ratio 1.0 0.8 - 1.7      Total Bilirubin 18.4 (H) 0.2 - 1.0 MG/DL    Bilirubin, Direct 13.7 (H) 0.0 - 0.2 MG/DL    Alk Phosphatase 267 (H) 45 - 117 U/L     (H) 10 - 38 U/L     (H) 13 - 56 U/L   Phosphorus    Collection Time: 08/29/24  5:22 AM   Result Value Ref Range    Phosphorus 3.0 2.5 - 4.9 MG/DL   Protime-INR    Collection Time: 08/29/24  5:22 AM   Result Value Ref Range    Protime 20.7 (H) 11.9 - 14.9 sec    INR 1.8 (H) 0.9 - 1.1     Basic Metabolic Panel    Collection Time: 08/29/24  5:22 AM   Result Value Ref Range    Sodium 137 136 - 145 mmol/L    Potassium 5.0 3.5 - 5.5 mmol/L    Chloride 109 100 - 111 mmol/L    CO2 19 (L) 21 - 32 mmol/L    Anion Gap 9 3.0 - 18 mmol/L    Glucose 74 74 - 99 mg/dL    BUN 79 (H) 7.0 - 18 MG/DL    Creatinine 2.32 (H) 0.6 - 1.3 MG/DL    BUN/Creatinine Ratio 34 (H) 12 - 20      Est, Glom Filt Rate 21 (L) >60 ml/min/1.73m2    Calcium 10.8 (H) 8.5 - 10.1 MG/DL   Iron and TIBC    Collection Time: 08/29/24  5:22 AM   Result Value Ref Range    Iron 46 (L) 50 - 175 ug/dL    TIBC 169 (L) 250 - 450 ug/dL    Iron % Saturation 27 20 - 50 %   Ferritin    Collection Time: 08/29/24  5:22 AM   Result Value Ref Range    Ferritin 320 8 - 388 NG/ML   Vitamin

## 2024-08-29 NOTE — CARE COORDINATION
CM sent out Home Health referral to [facility] in [facility].             Erlinda Jamison RN  Case Management [phone]

## 2024-08-30 ENCOUNTER — HOSPITAL ENCOUNTER (INPATIENT)
Facility: HOSPITAL | Age: 79
Discharge: HOME | DRG: 872 | End: 2024-09-02
Attending: STUDENT IN AN ORGANIZED HEALTH CARE EDUCATION/TRAINING PROGRAM
Payer: MEDICARE

## 2024-08-30 VITALS
SYSTOLIC BLOOD PRESSURE: 138 MMHG | HEART RATE: 90 BPM | RESPIRATION RATE: 16 BRPM | DIASTOLIC BLOOD PRESSURE: 68 MMHG | OXYGEN SATURATION: 99 %

## 2024-08-30 LAB
ALBUMIN SERPL-MCNC: 4.2 G/DL (ref 3.4–5)
ALBUMIN/GLOB SERPL: 1.3 (ref 0.8–1.7)
ALP SERPL-CCNC: 241 U/L (ref 45–117)
ALT SERPL-CCNC: 131 U/L (ref 13–56)
ANION GAP SERPL CALC-SCNC: 10 MMOL/L (ref 3–18)
AST SERPL-CCNC: 363 U/L (ref 10–38)
BASOPHILS # BLD: 0 K/UL (ref 0–0.1)
BASOPHILS NFR BLD: 0 % (ref 0–2)
BILIRUB DIRECT SERPL-MCNC: 15.2 MG/DL (ref 0–0.2)
BILIRUB SERPL-MCNC: 21.2 MG/DL (ref 0.2–1)
BUN SERPL-MCNC: 62 MG/DL (ref 7–18)
BUN/CREAT SERPL: 33 (ref 12–20)
CALCIUM SERPL-MCNC: 10.9 MG/DL (ref 8.5–10.1)
CALCIUM SERPL-MCNC: 11.1 MG/DL (ref 8.5–10.1)
CHLORIDE SERPL-SCNC: 111 MMOL/L (ref 100–111)
CO2 SERPL-SCNC: 17 MMOL/L (ref 21–32)
CREAT SERPL-MCNC: 1.88 MG/DL (ref 0.6–1.3)
DIFFERENTIAL METHOD BLD: ABNORMAL
EOSINOPHIL # BLD: 0 K/UL (ref 0–0.4)
EOSINOPHIL NFR BLD: 0 % (ref 0–5)
ERYTHROCYTE [DISTWIDTH] IN BLOOD BY AUTOMATED COUNT: 17.3 % (ref 11.6–14.5)
GLOBULIN SER CALC-MCNC: 3.2 G/DL (ref 2–4)
GLUCOSE BLD STRIP.AUTO-MCNC: 84 MG/DL (ref 70–110)
GLUCOSE SERPL-MCNC: 67 MG/DL (ref 74–99)
HCT VFR BLD AUTO: 26.8 % (ref 35–45)
HGB BLD-MCNC: 10.3 G/DL (ref 12–16)
IMM GRANULOCYTES # BLD AUTO: 0 K/UL (ref 0–0.04)
IMM GRANULOCYTES NFR BLD AUTO: 0 % (ref 0–0.5)
INR PPP: 1.9 (ref 0.9–1.1)
LYMPHOCYTES # BLD: 1.4 K/UL (ref 0.9–3.6)
LYMPHOCYTES NFR BLD: 7 % (ref 21–52)
MAGNESIUM SERPL-MCNC: 2.2 MG/DL (ref 1.6–2.6)
MCH RBC QN AUTO: 29 PG (ref 24–34)
MCHC RBC AUTO-ENTMCNC: 38.4 G/DL (ref 31–37)
MCV RBC AUTO: 75.5 FL (ref 78–100)
MONOCYTES # BLD: 1 K/UL (ref 0.05–1.2)
MONOCYTES NFR BLD: 5 % (ref 3–10)
NEUTS BAND NFR BLD MANUAL: 1 %
NEUTS SEG # BLD: 18.1 K/UL (ref 1.8–8)
NEUTS SEG NFR BLD: 87 % (ref 40–73)
NRBC # BLD: 0.12 K/UL (ref 0–0.01)
NRBC BLD-RTO: 0.6 PER 100 WBC
PHOSPHATE SERPL-MCNC: 2.5 MG/DL (ref 2.5–4.9)
PLATELET # BLD AUTO: 207 K/UL (ref 135–420)
PLATELET COMMENT: ABNORMAL
PMV BLD AUTO: 10.9 FL (ref 9.2–11.8)
POTASSIUM SERPL-SCNC: 5 MMOL/L (ref 3.5–5.5)
PROT SERPL-MCNC: 7.4 G/DL (ref 6.4–8.2)
PROTHROMBIN TIME: 21.6 SEC (ref 11.9–14.9)
PTH-INTACT SERPL-MCNC: 75.4 PG/ML (ref 18.4–88)
RBC # BLD AUTO: 3.55 M/UL (ref 4.2–5.3)
RBC MORPH BLD: ABNORMAL
RBC MORPH BLD: ABNORMAL
SODIUM SERPL-SCNC: 138 MMOL/L (ref 136–145)
WBC # BLD AUTO: 20.5 K/UL (ref 4.6–13.2)

## 2024-08-30 PROCEDURE — 85025 COMPLETE CBC W/AUTO DIFF WBC: CPT

## 2024-08-30 PROCEDURE — 83970 ASSAY OF PARATHORMONE: CPT

## 2024-08-30 PROCEDURE — 99232 SBSQ HOSP IP/OBS MODERATE 35: CPT | Performed by: HOSPITALIST

## 2024-08-30 PROCEDURE — 6360000002 HC RX W HCPCS: Performed by: INTERNAL MEDICINE

## 2024-08-30 PROCEDURE — 80048 BASIC METABOLIC PNL TOTAL CA: CPT

## 2024-08-30 PROCEDURE — 6370000000 HC RX 637 (ALT 250 FOR IP): Performed by: HOSPITALIST

## 2024-08-30 PROCEDURE — 99153 MOD SED SAME PHYS/QHP EA: CPT

## 2024-08-30 PROCEDURE — 0FB23ZX EXCISION OF LEFT LOBE LIVER, PERCUTANEOUS APPROACH, DIAGNOSTIC: ICD-10-PCS | Performed by: HOSPITALIST

## 2024-08-30 PROCEDURE — P9047 ALBUMIN (HUMAN), 25%, 50ML: HCPCS | Performed by: INTERNAL MEDICINE

## 2024-08-30 PROCEDURE — 6360000002 HC RX W HCPCS: Performed by: RADIOLOGY

## 2024-08-30 PROCEDURE — 94761 N-INVAS EAR/PLS OXIMETRY MLT: CPT

## 2024-08-30 PROCEDURE — 2580000003 HC RX 258: Performed by: INTERNAL MEDICINE

## 2024-08-30 PROCEDURE — 83735 ASSAY OF MAGNESIUM: CPT

## 2024-08-30 PROCEDURE — 80076 HEPATIC FUNCTION PANEL: CPT

## 2024-08-30 PROCEDURE — 2500000003 HC RX 250 WO HCPCS: Performed by: RADIOLOGY

## 2024-08-30 PROCEDURE — 88307 TISSUE EXAM BY PATHOLOGIST: CPT

## 2024-08-30 PROCEDURE — 86301 IMMUNOASSAY TUMOR CA 19-9: CPT

## 2024-08-30 PROCEDURE — 84100 ASSAY OF PHOSPHORUS: CPT

## 2024-08-30 PROCEDURE — 2580000003 HC RX 258: Performed by: HOSPITALIST

## 2024-08-30 PROCEDURE — 1100000000 HC RM PRIVATE

## 2024-08-30 PROCEDURE — 88360 TUMOR IMMUNOHISTOCHEM/MANUAL: CPT

## 2024-08-30 PROCEDURE — 82105 ALPHA-FETOPROTEIN SERUM: CPT

## 2024-08-30 PROCEDURE — 86304 IMMUNOASSAY TUMOR CA 125: CPT

## 2024-08-30 PROCEDURE — 85610 PROTHROMBIN TIME: CPT

## 2024-08-30 PROCEDURE — 82962 GLUCOSE BLOOD TEST: CPT

## 2024-08-30 PROCEDURE — 88333 PATH CONSLTJ SURG CYTO XM 1: CPT

## 2024-08-30 PROCEDURE — 36415 COLL VENOUS BLD VENIPUNCTURE: CPT

## 2024-08-30 PROCEDURE — 88342 IMHCHEM/IMCYTCHM 1ST ANTB: CPT

## 2024-08-30 PROCEDURE — 88341 IMHCHEM/IMCYTCHM EA ADD ANTB: CPT

## 2024-08-30 PROCEDURE — 88334 PATH CONSLTJ SURG CYTO XM EA: CPT

## 2024-08-30 RX ORDER — MIDAZOLAM HYDROCHLORIDE 1 MG/ML
INJECTION INTRAMUSCULAR; INTRAVENOUS PRN
Status: COMPLETED | OUTPATIENT
Start: 2024-08-30 | End: 2024-08-30

## 2024-08-30 RX ORDER — CALCITONIN SALMON 200 [USP'U]/ML
4 INJECTION, SOLUTION INTRAMUSCULAR; SUBCUTANEOUS EVERY 12 HOURS
Status: COMPLETED | OUTPATIENT
Start: 2024-08-30 | End: 2024-08-30

## 2024-08-30 RX ORDER — FENTANYL CITRATE 50 UG/ML
INJECTION, SOLUTION INTRAMUSCULAR; INTRAVENOUS PRN
Status: COMPLETED | OUTPATIENT
Start: 2024-08-30 | End: 2024-08-30

## 2024-08-30 RX ORDER — SODIUM CHLORIDE 9 MG/ML
INJECTION, SOLUTION INTRAVENOUS CONTINUOUS
Status: DISCONTINUED | OUTPATIENT
Start: 2024-08-30 | End: 2024-08-31

## 2024-08-30 RX ORDER — LIDOCAINE HYDROCHLORIDE 10 MG/ML
INJECTION, SOLUTION EPIDURAL; INFILTRATION; INTRACAUDAL; PERINEURAL PRN
Status: COMPLETED | OUTPATIENT
Start: 2024-08-30 | End: 2024-08-30

## 2024-08-30 RX ADMIN — FENTANYL CITRATE 25 MCG: 50 INJECTION INTRAMUSCULAR; INTRAVENOUS at 12:31

## 2024-08-30 RX ADMIN — LIDOCAINE HYDROCHLORIDE 10 ML: 10 INJECTION, SOLUTION EPIDURAL; INFILTRATION; INTRACAUDAL; PERINEURAL at 12:24

## 2024-08-30 RX ADMIN — CALCITONIN SALMON 310 UNITS: 200 INJECTION, SOLUTION INTRAMUSCULAR; SUBCUTANEOUS at 11:29

## 2024-08-30 RX ADMIN — FENTANYL CITRATE 25 MCG: 50 INJECTION INTRAMUSCULAR; INTRAVENOUS at 12:19

## 2024-08-30 RX ADMIN — MIDAZOLAM 0.5 MG: 1 INJECTION INTRAMUSCULAR; INTRAVENOUS at 12:32

## 2024-08-30 RX ADMIN — SODIUM CHLORIDE, PRESERVATIVE FREE 10 ML: 5 INJECTION INTRAVENOUS at 08:52

## 2024-08-30 RX ADMIN — CALCITONIN SALMON 310 UNITS: 200 INJECTION, SOLUTION INTRAMUSCULAR; SUBCUTANEOUS at 23:37

## 2024-08-30 RX ADMIN — SODIUM CHLORIDE, PRESERVATIVE FREE 10 ML: 5 INJECTION INTRAVENOUS at 23:37

## 2024-08-30 RX ADMIN — CYANOCOBALAMIN TAB 500 MCG 500 MCG: 500 TAB at 08:51

## 2024-08-30 RX ADMIN — SODIUM CHLORIDE: 9 INJECTION, SOLUTION INTRAVENOUS at 10:00

## 2024-08-30 RX ADMIN — FOLIC ACID 1 MG: 1 TABLET ORAL at 08:51

## 2024-08-30 RX ADMIN — ALBUMIN (HUMAN) 25 G: 0.25 INJECTION, SOLUTION INTRAVENOUS at 14:16

## 2024-08-30 RX ADMIN — MIDAZOLAM 0.5 MG: 1 INJECTION INTRAMUSCULAR; INTRAVENOUS at 12:21

## 2024-08-30 ASSESSMENT — PAIN SCALES - GENERAL
PAINLEVEL_OUTOF10: 0

## 2024-08-30 NOTE — PROGRESS NOTES
Preprocedure Assessment      Today 8/30/2024     Indication/Symptoms:  Yolie Limon is a 79 y.o. female with liver failure and liver masses concerning for malignancy here for a liver mass biopsy with moderate sedation.    The H & P and/or progress notes and any available imaging were reviewed.  The risks, indications and possible alternatives to the procedure, including doing nothing, were discussed and informed consent was obtained.    Physical Exam:    Mallampati 2 ASA 3   General: Awake and alert and oriented x 3, NAD   Heart:   RRR  Lungs:   Normal work of breathing    The patient is an appropriate candidate to undergo the planned procedure and sedation.    RASHAD Tay

## 2024-08-30 NOTE — PLAN OF CARE
Problem: Skin/Tissue Integrity  Goal: Absence of new skin breakdown  Description: 1.  Monitor for areas of redness and/or skin breakdown  2.  Assess vascular access sites hourly  3.  Every 4-6 hours minimum:  Change oxygen saturation probe site  4.  Every 4-6 hours:  If on nasal continuous positive airway pressure, respiratory therapy assess nares and determine need for appliance change or resting period.  8/30/2024 1031 by Ivett Lombardi, RN  Outcome: Progressing  8/30/2024 0100 by Meggan Carvajal RN  Outcome: Progressing     Problem: Safety - Adult  Goal: Free from fall injury  8/30/2024 1031 by Ivett Lombardi, RN  Outcome: Progressing  8/30/2024 0100 by Meggan Carvajal RN  Outcome: Progressing     Problem: Pain  Goal: Verbalizes/displays adequate comfort level or baseline comfort level  8/30/2024 1031 by Ivett Lombardi, RN  Outcome: Progressing  8/30/2024 0100 by Meggan Carvajal, RN  Outcome: Progressing

## 2024-08-30 NOTE — PROGRESS NOTES
Progress Note      79-year-old female with past medical history of hypertensions, arthritis came to the ER with abdominal pain, following for renal failure  Interim event     Overnight event noted  For liver biopsy today      IMPRESSION:   acute kidney injury  , likely atn, risk factors diarrhea, on hydrochlorothiazide, on losartan and meloxicam per medication list ,improving  Sepsis, leukocytosis, abnormal urinalysis  Elevated LFT, hypercoagulability, higher total bilirubin, suspect hepatic metastasis on CT scan  Hypertension  Hypercalcemia, improving ,continue ns   PLAN:   Renal function is improving,continue ns  Awaiting  urine protein study,   Monitor for urine retention.   Adjust medication per current renal function status.             Current Facility-Administered Medications   Medication Dose Route Frequency    calcitonin (MIACALCIN) injection 310 Units  4 Units/kg SubCUTAneous Q12H    0.9 % sodium chloride infusion   IntraVENous Continuous    albumin human 25% IV solution 25 g  25 g IntraVENous Q12H    vitamin B-12 (CYANOCOBALAMIN) tablet 500 mcg  500 mcg Oral Daily    folic acid (FOLVITE) tablet 1 mg  1 mg Oral Daily    sodium chloride flush 0.9 % injection 5-40 mL  5-40 mL IntraVENous 2 times per day    sodium chloride flush 0.9 % injection 5-40 mL  5-40 mL IntraVENous PRN    0.9 % sodium chloride infusion   IntraVENous PRN    [Held by provider] heparin (porcine) injection 5,000 Units  5,000 Units SubCUTAneous 3 times per day    ondansetron (ZOFRAN-ODT) disintegrating tablet 4 mg  4 mg Oral Q8H PRN    Or    ondansetron (ZOFRAN) injection 4 mg  4 mg IntraVENous Q6H PRN    polyethylene glycol (GLYCOLAX) packet 17 g  17 g Oral Daily PRN    acetaminophen (TYLENOL) tablet 500 mg  500 mg Oral Q8H PRN     Facility-Administered Medications Ordered in Other Encounters   Medication Dose Route Frequency    lidocaine PF 1 % injection    PRN       Review of Systems:     Complete 10-point review of systems were

## 2024-08-30 NOTE — PROGRESS NOTES
TRANSFER - OUT REPORT:    Verbal report given to DAYAN Root (name) on Yolie Limon being transferred to [address] [place] [address] (unit) for routine progression of patient care       Report consisted of patient's Situation, Background, Assessment and   Recommendations(SBAR).     Information from the following report(s) Nurse Handoff Report was reviewed with the receiving nurse.    Opportunity for questions and clarification was provided.      Patient transported with:

## 2024-08-30 NOTE — PLAN OF CARE
Problem: Skin/Tissue Integrity  Goal: Absence of new skin breakdown  Description: 1.  Monitor for areas of redness and/or skin breakdown  2.  Assess vascular access sites hourly  3.  Every 4-6 hours minimum:  Change oxygen saturation probe site  4.  Every 4-6 hours:  If on nasal continuous positive airway pressure, respiratory therapy assess nares and determine need for appliance change or resting period.  8/30/2024 0100 by Meggan Carvajal RN  Outcome: Progressing  8/29/2024 1231 by Mildred Diaz RN  Outcome: Progressing     Problem: Safety - Adult  Goal: Free from fall injury  8/30/2024 0100 by Meggan Carvajal RN  Outcome: Progressing  8/29/2024 1231 by Mildred Diaz RN  Outcome: Progressing  Flowsheets (Taken 8/29/2024 0743)  Free From Fall Injury:   Instruct family/caregiver on patient safety   Based on caregiver fall risk screen, instruct family/caregiver to ask for assistance with transferring infant if caregiver noted to have fall risk factors     Problem: Pain  Goal: Verbalizes/displays adequate comfort level or baseline comfort level  8/30/2024 0100 by Meggan Carvajal RN  Outcome: Progressing  8/29/2024 1231 by Mildred Diaz RN  Outcome: Progressing     Problem: Physical Therapy - Adult  Goal: By Discharge: Performs mobility at highest level of function for planned discharge setting.  See evaluation for individualized goals.  Description: Initiated  8/29/24  to be met within 7-10 days.    1.  Patient will move from supine to sit and sit to supine , scoot up and down, and roll side to side in bed with modified independence.    2.  Patient will transfer from bed to chair and chair to bed with modified independence using the least restrictive device.  3.  Patient will perform sit to stand with modified independence.  4.  Patient will ambulate with modified independence for 50 feet with the least restrictive device for improved safety with home ambulation.       PLOF: Pt lives with her Mother

## 2024-08-30 NOTE — CONSULTS
Phone: [phone]      [facility]  Hematology / Oncology Consult Note      Patient: Yolie Limon  Gender: female   MRN: [id number]    YOB: 1945 Age: 79 y.o.   CSN: [id number]    LOS:  LOS: 2 days   Admit Date: 8/28/2024    Reason for Consultation:  Yolie Limon is a 79 y.o. female who I've been asked to consult for hepatic failure, liver masses.      Subjective:     HPI:  Ms Limon is transferred from [facility] with abnormal LFTs , hepatic failure and ct abd noting liver masses  and left adnexal mass concerning for cancer.    She was well until three days prior presentation. She developed diarrhea, abdominal pain, poor intake and noted her eyes turning yellow. She denies weight loss.    She has no chest pain, cough or sob.    She has elevated Cr and seen by nephrology.      Past Medical History:   Diagnosis Date    Arthritis     Breast cyst     High cholesterol     Hypertension     Menopause      Past Surgical History:   Procedure Laterality Date    APPENDECTOMY      CHOLECYSTECTOMY      HYSTERECTOMY (CERVIX STATUS UNKNOWN)      Partial     @socr@  No family history on file.  No Known Allergies      Hospital Medications:   Current hospital medications reviewed    Review of Systems  Constitutional:   Fever: Negative, Chills: Negative, Weight Loss: Negative, Malaise/Fatigue: present   Diaphoresis: Negative,  Weakness: Negative  Skin:   Rash: Negative,  Itching: Negative  HENT:   Headache:Negative, Hearing Loss: Negative, Tinnitus: Negative, Ear Pain: Negative   Nosebleeds: Negative, Congestion: Negative, Stridor: Negative,   Sore Throat: Negative  Eyes: jaundice   Blurred Vision: Negative, Double Vision: Negative, Photophobia: Negative   Eye Pain: Negative, Eye Discharge: Negative, Eye Redness: Negative  Cardiovascular:    Chest Pain: Negative, Palpitations: Negative, Orthopnea: Negative   Claudication: Negative, Leg Swelling: Negative PND: Negative  Respiratory:   Cough:  Negative, Hemoptysis: Negative, Sputum Production: Negative   Shortness of Breath: Negative, Wheezing: Negative  Gastrointestinal:   Heartburn: Negative, Nausea: Negative, Vomiting: Negative   Abdominal Pain: present, Diarrhea: present, Constipation: Negative   Blood in Stool: Negative, Melena: Negative   Genitourinary:    Dysuria: Negative, Urgency: Negative, Frequency: Negative   Hematuria: Negative, Flank Pain: Negative  Musculoskeletal:    Myalgias: Negative, Neck Pain: Negative, Back Pain: Negative   Joint Pain: Negative, Falls: Negative  Endo/Heme/Allergies:    Easy Bruise/Blood: Negative, Env. Allergies: Negative, Polydipsia: Negative   Neurological:    Dizziness: Negative, Tingling: Negative. Tremor: Negative, memory:normal   Sensory Change: Negative. Speech Change: Negative, Focal Weakness: Negative      Psychiatric:   Depression: Negative, Suicidal Ideas: Negative, Substance Abuse: Negative   Hallucinations: Negative, Nervous/Anxious: Negative       Physical Exam:    Constitutional: frail no acute distress and alert and oriented x 3   HENT: Oral mucosa pink and moist, no mucositis/ thrush, poo dentition   Eyes: conjunctiva normal, presence icterus   Neck: No jugular venous distension present.   Cardiovascular: heart sounds normal, normal rate and regular rhythm, no murmurs    Pulmonary/Chest Wall: breath sounds are clear bilaterally    Abdominal: Non tender,  no hepatosplenomegaly   Neuro:  Alert, non focal   Musculoskeletal:  Normal muscle strength and tone.   Skin: No rash/ecchymosis   Extremities No edema present in extremities          Labs:  Recent Labs     08/28/24  0100 08/29/24  0522 08/30/24  0302   WBC 27.9* 20.0* 20.5*   RBC 4.60 3.67* 3.55*   HCT 34.5* 27.7* 26.8*   MCV 75.0* 75.5* 75.5*   MCH 28.3 28.5 29.0   MCHC 37.7* 37.9* 38.4*   RDW 16.9* 16.5* 17.3*   MPV 10.6 10.7 10.9       Recent Labs     08/28/24  0100 08/29/24  0522 08/30/24  0302   CO2 20* 19* 17*   ANIONGAP 12 9 10   CALCIUM 10.9*

## 2024-08-30 NOTE — PROGRESS NOTES
[facility] Hospitalist Group  Progress Note    Patient: Yolie Limon Age: 79 y.o. : 1945 MR#: [id number] SSN: [id number]  Date/Time: 2024    Subjective:     Status post liver biopsy per interventional radiology.    Patient seen and examined at bedside, one of her sons, and a granddaughter is at the bedside.  Patient reports she feels fairly well since the procedure, pain is well-controlled.  Denies chest pain, abdominal pain.  She is hungry.  Her PCP is in [place].  She states that the oncologist left her contact information at the bedside so she can make an appointment when she is discharged from the hospital.    Assessment/Plan:     1. Liver lesions, metastatic dz suspected.  Status post liver biopsy, follow pathology.  Oncology input appreciated, patient will need clinic follow-up.  2. LUNA, improving.  No hydronephrosis on CT.  Nephrology input appreciated.  3. Recent UTI. Urine cx in process.  4. Elevated LFTs, likely related to metastatic disease - trend. .  5. 3.4 x 2.9 cm left adnexal mass.   6. Few 2-3 mm right lung nodules.   7.  Baseline functional status: Walks with cane.  8.  Nonanion gap metabolic acidosis, in the setting of LUNA and normal saline.  9.  Leukocytosis POA, no evidence of infection.  Please follow urine culture.  10.  Anemia, microcytic. iron studies B12 noted.   11.  Hold chemical DVT prophylaxis   12.  Full code.  Did well with pt /ot.  Son and granddaughter updated at bedside, all questions answered to the best my ability.    Additional Notes: BREANNA 2024, if stable tomorrow.    Case discussed with:  [x]patient  [x]family  [x]nursing  []case management   [] discussed on IDT.   DVT Prophylaxis:  []Lovenox  []Hep SQ  []SCDs  []Coumadin   []On Heparin gtt   [] noac    Objective:   VS: BP (!) 155/89   Pulse 93   Temp 97.6 °F (36.4 °C) (Oral)   Resp 18   Ht 1.7 m (5' 6.93\")   Wt 77.3 kg (170 lb 8 oz)   SpO2 98%   BMI 26.76 kg/m²   Granulocytes Absolute 0.0 0.00 - 0.04 K/UL    Differential Type MANUAL      Platelet Comment ADEQUATE PLATELETS      RBC Comment ANISOCYTOSIS  1+        RBC Comment TARGET CELLS  2+       Basic Metabolic Panel    Collection Time: 08/30/24  3:02 AM   Result Value Ref Range    Sodium 138 136 - 145 mmol/L    Potassium 5.0 3.5 - 5.5 mmol/L    Chloride 111 100 - 111 mmol/L    CO2 17 (L) 21 - 32 mmol/L    Anion Gap 10 3.0 - 18 mmol/L    Glucose 67 (L) 74 - 99 mg/dL    BUN 62 (H) 7.0 - 18 MG/DL    Creatinine 1.88 (H) 0.6 - 1.3 MG/DL    BUN/Creatinine Ratio 33 (H) 12 - 20      Est, Glom Filt Rate 27 (L) >60 ml/min/1.73m2    Calcium 10.9 (H) 8.5 - 10.1 MG/DL   Hepatic Function Panel    Collection Time: 08/30/24  3:02 AM   Result Value Ref Range    Total Protein 7.4 6.4 - 8.2 g/dL    Albumin 4.2 3.4 - 5.0 g/dL    Globulin 3.2 2.0 - 4.0 g/dL    Albumin/Globulin Ratio 1.3 0.8 - 1.7      Total Bilirubin 21.2 (H) 0.2 - 1.0 MG/DL    Bilirubin, Direct 15.2 (H) 0.0 - 0.2 MG/DL    Alk Phosphatase 241 (H) 45 - 117 U/L     (H) 10 - 38 U/L     (H) 13 - 56 U/L   Magnesium    Collection Time: 08/30/24  3:02 AM   Result Value Ref Range    Magnesium 2.2 1.6 - 2.6 mg/dL   Phosphorus    Collection Time: 08/30/24  3:02 AM   Result Value Ref Range    Phosphorus 2.5 2.5 - 4.9 MG/DL   Protime-INR    Collection Time: 08/30/24  3:02 AM   Result Value Ref Range    Protime 21.6 (H) 11.9 - 14.9 sec    INR 1.9 (H) 0.9 - 1.1     POCT Glucose    Collection Time: 08/30/24 11:50 AM   Result Value Ref Range    POC Glucose 84 70 - 110 mg/dL     Additional Data Reviewed:      Signed By: Gisell Martinez MD     August 30, 2024 4:51 PM

## 2024-08-31 LAB
AFP-TM SERPL-MCNC: 3.6 NG/ML (ref 0–9.2)
ALBUMIN SERPL-MCNC: 3.9 G/DL (ref 3.4–5)
ALBUMIN/GLOB SERPL: 1.2 (ref 0.8–1.7)
ALP SERPL-CCNC: 227 U/L (ref 45–117)
ALT SERPL-CCNC: 141 U/L (ref 13–56)
ANION GAP SERPL CALC-SCNC: 11 MMOL/L (ref 3–18)
AST SERPL-CCNC: 414 U/L (ref 10–38)
BACTERIA SPEC CULT: ABNORMAL
BACTERIA SPEC CULT: ABNORMAL
BILIRUB SERPL-MCNC: 23.9 MG/DL (ref 0.2–1)
BUN SERPL-MCNC: 55 MG/DL (ref 7–18)
BUN/CREAT SERPL: 32 (ref 12–20)
CALCIUM SERPL-MCNC: 10.3 MG/DL (ref 8.5–10.1)
CANCER AG125 SERPL-ACNC: 48 U/ML (ref 1.5–35)
CANCER AG19-9 SERPL-ACNC: 3 U/ML (ref 0–35)
CC UR VC: ABNORMAL
CHLORIDE SERPL-SCNC: 113 MMOL/L (ref 100–111)
CO2 SERPL-SCNC: 17 MMOL/L (ref 21–32)
CREAT SERPL-MCNC: 1.7 MG/DL (ref 0.6–1.3)
GLOBULIN SER CALC-MCNC: 3.2 G/DL (ref 2–4)
GLUCOSE SERPL-MCNC: 83 MG/DL (ref 74–99)
POTASSIUM SERPL-SCNC: 5.3 MMOL/L (ref 3.5–5.5)
PROT SERPL-MCNC: 7.1 G/DL (ref 6.4–8.2)
SERVICE CMNT-IMP: ABNORMAL
SODIUM SERPL-SCNC: 141 MMOL/L (ref 136–145)

## 2024-08-31 PROCEDURE — 6360000002 HC RX W HCPCS: Performed by: PHYSICIAN ASSISTANT

## 2024-08-31 PROCEDURE — 2580000003 HC RX 258: Performed by: INTERNAL MEDICINE

## 2024-08-31 PROCEDURE — 80053 COMPREHEN METABOLIC PANEL: CPT

## 2024-08-31 PROCEDURE — 2500000003 HC RX 250 WO HCPCS: Performed by: INTERNAL MEDICINE

## 2024-08-31 PROCEDURE — 2580000003 HC RX 258: Performed by: HOSPITALIST

## 2024-08-31 PROCEDURE — 6370000000 HC RX 637 (ALT 250 FOR IP): Performed by: HOSPITALIST

## 2024-08-31 PROCEDURE — 99232 SBSQ HOSP IP/OBS MODERATE 35: CPT | Performed by: HOSPITALIST

## 2024-08-31 PROCEDURE — 1100000000 HC RM PRIVATE

## 2024-08-31 PROCEDURE — 94761 N-INVAS EAR/PLS OXIMETRY MLT: CPT

## 2024-08-31 PROCEDURE — 36415 COLL VENOUS BLD VENIPUNCTURE: CPT

## 2024-08-31 RX ORDER — AMOXICILLIN 250 MG/1
500 CAPSULE ORAL EVERY 8 HOURS SCHEDULED
Status: DISCONTINUED | OUTPATIENT
Start: 2024-08-31 | End: 2024-09-01 | Stop reason: HOSPADM

## 2024-08-31 RX ADMIN — SODIUM BICARBONATE: 84 INJECTION, SOLUTION INTRAVENOUS at 15:19

## 2024-08-31 RX ADMIN — SODIUM CHLORIDE, PRESERVATIVE FREE 10 ML: 5 INJECTION INTRAVENOUS at 22:25

## 2024-08-31 RX ADMIN — AMOXICILLIN 500 MG: 250 CAPSULE ORAL at 22:19

## 2024-08-31 RX ADMIN — SODIUM CHLORIDE, PRESERVATIVE FREE 10 ML: 5 INJECTION INTRAVENOUS at 09:32

## 2024-08-31 RX ADMIN — FOLIC ACID 1 MG: 1 TABLET ORAL at 09:31

## 2024-08-31 RX ADMIN — HEPARIN SODIUM 5000 UNITS: 5000 INJECTION INTRAVENOUS; SUBCUTANEOUS at 07:48

## 2024-08-31 RX ADMIN — HEPARIN SODIUM 5000 UNITS: 5000 INJECTION INTRAVENOUS; SUBCUTANEOUS at 22:21

## 2024-08-31 RX ADMIN — SODIUM CHLORIDE: 9 INJECTION, SOLUTION INTRAVENOUS at 07:30

## 2024-08-31 RX ADMIN — HEPARIN SODIUM 5000 UNITS: 5000 INJECTION INTRAVENOUS; SUBCUTANEOUS at 14:23

## 2024-08-31 RX ADMIN — CYANOCOBALAMIN TAB 500 MCG 500 MCG: 500 TAB at 09:32

## 2024-08-31 ASSESSMENT — PAIN SCALES - GENERAL
PAINLEVEL_OUTOF10: 0

## 2024-08-31 NOTE — PROGRESS NOTES
Progress Note      79-year-old female with past medical history of hypertensions, arthritis came to the ER with abdominal pain, following for renal failure  Interim event     Overnight event noted  S/p liver biopsy       IMPRESSION:   acute kidney injury  , likely atn, risk factors diarrhea, on hydrochlorothiazide, on losartan and meloxicam per medication list ,improving  Sepsis, leukocytosis, abnormal urinalysis  Elevated LFT, hypercoagulability, higher total bilirubin, suspect hepatic metastasis on CT scan  Hypertension  Hypercalcemia, improving ,received calcitonin   PLAN:   Renal function is improving,discussed with family  Mild metabolic acidosis,add bicarb to ivf  Awaiting  urine protein study,   Monitor for urine retention.   Adjust medication per current renal function status.             Current Facility-Administered Medications   Medication Dose Route Frequency    vitamin B-12 (CYANOCOBALAMIN) tablet 500 mcg  500 mcg Oral Daily    folic acid (FOLVITE) tablet 1 mg  1 mg Oral Daily    sodium chloride flush 0.9 % injection 5-40 mL  5-40 mL IntraVENous 2 times per day    sodium chloride flush 0.9 % injection 5-40 mL  5-40 mL IntraVENous PRN    0.9 % sodium chloride infusion   IntraVENous PRN    heparin (porcine) injection 5,000 Units  5,000 Units SubCUTAneous 3 times per day    ondansetron (ZOFRAN-ODT) disintegrating tablet 4 mg  4 mg Oral Q8H PRN    Or    ondansetron (ZOFRAN) injection 4 mg  4 mg IntraVENous Q6H PRN    polyethylene glycol (GLYCOLAX) packet 17 g  17 g Oral Daily PRN    acetaminophen (TYLENOL) tablet 500 mg  500 mg Oral Q8H PRN       Review of Systems:     Complete 10-point review of systems were obtained and discussed in length  with the patient. Complete review of systems was negative/unremarkable  except as mentioned in HPI section.  Data Review:    Labs: Results:       Chemistry Recent Labs     08/29/24  0522 08/30/24  0302 08/31/24  0533    138 141   K 5.0 5.0 5.3    111

## 2024-08-31 NOTE — PROGRESS NOTES
[facility]  Progress Note    Patient: Yolie Limon Age: 79 y.o. : 1945 MR#: [id number] SSN: [id number]  Date/Time: 2024    Subjective:     POD1 liver biopsy per interventional radiology.    Patient seen and examined at bedside, she reports she is doing fine.  Denies significant pain anywhere.  Denies shortness of breath.    Assessment/Plan:     1. Liver lesions, metastatic dz suspected.  Status post liver biopsy, follow pathology.  Oncology input appreciated, patient will need clinic follow-up.  2. LUNA, improving.  No hydronephrosis on CT.  Nephrology input appreciated.  3. E faecalis UTI. Amoxicillin.   4. Elevated LFTs, likely related to metastatic disease - trend. .  5. 3.4 x 2.9 cm left adnexal mass.   6. Few 2-3 mm right lung nodules.   7.  Baseline functional status: Walks with cane.  8.  Nonanion gap metabolic acidosis. Bicarb gtt, nephrology input appreciated.  9.  Leukocytosis POA, no evidence of infection.  Please follow urine culture.  10.  Anemia, microcytic. iron studies B12 noted.   11.  Hypercalcemia. S/p calcitonin. Consider pamidronate pending renal function.   12. DVT prophylaxis   13.  Full code.  Did well with pt /ot.  I discussed the case with Dr Geller.    Additional Notes: BREANNA 2024, pending renal function.    Case discussed with:  [x]patient  []family  [x]nursing  []case management   [] discussed on IDT.   DVT Prophylaxis:  []Lovenox  [x]Hep SQ  []SCDs  []Coumadin   []On Heparin gtt   [] noac    Objective:   VS: /80   Pulse 89   Temp 97.6 °F (36.4 °C) (Oral)   Resp 18   Ht 1.7 m (5' 6.93\")   Wt 77.3 kg (170 lb 8 oz)   SpO2 99%   BMI 26.76 kg/m²    Tmax/24hrs: Temp (24hrs), Av.8 °F (36.6 °C), Min:97.6 °F (36.4 °C), Max:98.3 °F (36.8 °C)    Input/Output:   Intake/Output Summary (Last 24 hours) at 2024 1852  Last data filed at 2024 1837  Gross per 24 hour   Intake 560 ml   Output --   Net 560 ml

## 2024-09-01 VITALS
SYSTOLIC BLOOD PRESSURE: 115 MMHG | OXYGEN SATURATION: 97 % | DIASTOLIC BLOOD PRESSURE: 69 MMHG | BODY MASS INDEX: 26.76 KG/M2 | RESPIRATION RATE: 18 BRPM | HEIGHT: 67 IN | HEART RATE: 104 BPM | TEMPERATURE: 98.6 F | WEIGHT: 170.5 LBS

## 2024-09-01 LAB
ALBUMIN SERPL-MCNC: 3.4 G/DL (ref 3.4–5)
ANION GAP SERPL CALC-SCNC: 9 MMOL/L (ref 3–18)
BASOPHILS # BLD: 0 K/UL (ref 0–0.1)
BASOPHILS NFR BLD: 0 % (ref 0–2)
BUN SERPL-MCNC: 64 MG/DL (ref 7–18)
BUN/CREAT SERPL: 35 (ref 12–20)
CALCIUM SERPL-MCNC: 10 MG/DL (ref 8.5–10.1)
CHLORIDE SERPL-SCNC: 110 MMOL/L (ref 100–111)
CO2 SERPL-SCNC: 17 MMOL/L (ref 21–32)
CREAT SERPL-MCNC: 1.84 MG/DL (ref 0.6–1.3)
DIFFERENTIAL METHOD BLD: ABNORMAL
EOSINOPHIL # BLD: 0 K/UL (ref 0–0.4)
EOSINOPHIL NFR BLD: 0 % (ref 0–5)
ERYTHROCYTE [DISTWIDTH] IN BLOOD BY AUTOMATED COUNT: 19.9 % (ref 11.6–14.5)
GLUCOSE SERPL-MCNC: 72 MG/DL (ref 74–99)
HCT VFR BLD AUTO: 28.6 % (ref 35–45)
HGB BLD-MCNC: 10.6 G/DL (ref 12–16)
IMM GRANULOCYTES # BLD AUTO: 0 K/UL
IMM GRANULOCYTES NFR BLD AUTO: 0 %
LYMPHOCYTES # BLD: 1.7 K/UL (ref 0.9–3.6)
LYMPHOCYTES NFR BLD: 7 % (ref 21–52)
MCH RBC QN AUTO: 28.3 PG (ref 24–34)
MCHC RBC AUTO-ENTMCNC: 37.1 G/DL (ref 31–37)
MCV RBC AUTO: 76.5 FL (ref 78–100)
METAMYELOCYTES NFR BLD MANUAL: 1 %
MONOCYTES # BLD: 0.7 K/UL (ref 0.05–1.2)
MONOCYTES NFR BLD: 3 % (ref 3–10)
MYELOCYTES NFR BLD MANUAL: 1 %
NEUTS BAND NFR BLD MANUAL: 4 % (ref 0–5)
NEUTS SEG # BLD: 21 K/UL (ref 1.8–8)
NEUTS SEG NFR BLD: 84 % (ref 40–73)
NRBC # BLD: 0.2 K/UL (ref 0–0.01)
NRBC BLD-RTO: 0.8 PER 100 WBC
PHOSPHATE SERPL-MCNC: 1.6 MG/DL (ref 2.5–4.9)
PLATELET # BLD AUTO: 174 K/UL (ref 135–420)
PMV BLD AUTO: 11.1 FL (ref 9.2–11.8)
POTASSIUM SERPL-SCNC: 5.3 MMOL/L (ref 3.5–5.5)
RBC # BLD AUTO: 3.74 M/UL (ref 4.2–5.3)
RBC MORPH BLD: ABNORMAL
SODIUM SERPL-SCNC: 136 MMOL/L (ref 136–145)
WBC # BLD AUTO: 23.9 K/UL (ref 4.6–13.2)

## 2024-09-01 PROCEDURE — 99239 HOSP IP/OBS DSCHRG MGMT >30: CPT | Performed by: HOSPITALIST

## 2024-09-01 PROCEDURE — 6360000002 HC RX W HCPCS: Performed by: PHYSICIAN ASSISTANT

## 2024-09-01 PROCEDURE — 6370000000 HC RX 637 (ALT 250 FOR IP): Performed by: HOSPITALIST

## 2024-09-01 PROCEDURE — 2580000003 HC RX 258: Performed by: HOSPITALIST

## 2024-09-01 PROCEDURE — 2580000003 HC RX 258: Performed by: INTERNAL MEDICINE

## 2024-09-01 PROCEDURE — 80069 RENAL FUNCTION PANEL: CPT

## 2024-09-01 PROCEDURE — 2500000003 HC RX 250 WO HCPCS: Performed by: INTERNAL MEDICINE

## 2024-09-01 PROCEDURE — 85025 COMPLETE CBC W/AUTO DIFF WBC: CPT

## 2024-09-01 PROCEDURE — 36415 COLL VENOUS BLD VENIPUNCTURE: CPT

## 2024-09-01 RX ORDER — SODIUM BICARBONATE 650 MG/1
650 TABLET ORAL 2 TIMES DAILY
Qty: 60 TABLET | Refills: 0 | Status: SHIPPED | OUTPATIENT
Start: 2024-09-01 | End: 2025-09-01

## 2024-09-01 RX ORDER — AMOXICILLIN 500 MG/1
500 CAPSULE ORAL EVERY 8 HOURS SCHEDULED
Qty: 15 CAPSULE | Refills: 0 | Status: SHIPPED | OUTPATIENT
Start: 2024-09-01 | End: 2024-09-06

## 2024-09-01 RX ORDER — LACTOBACILLUS RHAMNOSUS GG 10B CELL
1 CAPSULE ORAL DAILY
Qty: 8 CAPSULE | Refills: 0 | Status: SHIPPED | OUTPATIENT
Start: 2024-09-01 | End: 2024-09-09

## 2024-09-01 RX ADMIN — HEPARIN SODIUM 5000 UNITS: 5000 INJECTION INTRAVENOUS; SUBCUTANEOUS at 06:43

## 2024-09-01 RX ADMIN — AMOXICILLIN 500 MG: 250 CAPSULE ORAL at 06:43

## 2024-09-01 RX ADMIN — CYANOCOBALAMIN TAB 500 MCG 500 MCG: 500 TAB at 07:48

## 2024-09-01 RX ADMIN — SODIUM CHLORIDE, PRESERVATIVE FREE 10 ML: 5 INJECTION INTRAVENOUS at 07:49

## 2024-09-01 RX ADMIN — SODIUM BICARBONATE: 84 INJECTION, SOLUTION INTRAVENOUS at 10:22

## 2024-09-01 RX ADMIN — FOLIC ACID 1 MG: 1 TABLET ORAL at 07:48

## 2024-09-01 RX ADMIN — POTASSIUM & SODIUM PHOSPHATES POWDER PACK 280-160-250 MG 250 MG: 280-160-250 PACK at 10:16

## 2024-09-01 ASSESSMENT — PAIN SCALES - GENERAL
PAINLEVEL_OUTOF10: 0

## 2024-09-01 NOTE — DISCHARGE SUMMARY
Discharge Summary    Patient: Yolie Limon               Sex: female          DOA: 8/28/2024         YOB: 1945      Age:  79 y.o.        LOS:  LOS: 4 days                Admit Date: 8/28/2024    Discharge Date: 9/1/2024    Admission Diagnoses: LUNA (acute kidney injury) (HCC) [N17.9]    Reason for Admission:    Yolie Limon is a 79 y.o. female with past medical hx of dyslipidemia, hypertension, who presented to [facility] with right flank pain which started 3 days prior.  Patient stated it felt like a \"sting.\"  Pain was rated 2 out of 10, improved to 0 out of 10.  Alleviated with drinking ice water, denied aggravating factors.  Pain was nonradiating, she denied associated dysuria, hematuria.  When the pain came on, she experienced nausea and vomiting, nonbloody.  She had nonbloody diarrhea as well, which she attributed to recent use of antibiotic which she was prescribed for urinary tract infection.  At [place], patient had CT chest abdomen and pelvis which revealed hepatomegaly with numerous subtle hypodense masses, concerning for diffuse metastatic involvement, and a 3.4 x 2.9 cm left adnexal mass.  Also few 2 to 3 mm right lung nodules.  Patient was transferred to [address] for further management.     Patient reported she wishes to pursue diagnosis of the lesions.  Her daughter Wen at bedside reported patient was feeling off balance, and she fell when coming down the stairs about a month ago.  Patient walks with a cane at baseline.  Daughter reported patient had an MRI brain in [place] which was reassuring, these results were not visible in care everywhere however at the time of admission. Patient's son Jesus was also present at the bedside.     Discharge Condition:  fair    Hospital Course:  1. Liver lesions, metastatic dz suspected.  Status post liver biopsy, follow pathology.  Oncology input appreciated, patient will need clinic follow-up.  2.  to arrange clinic f/u with nephrology.       Time spent 42 minutes.

## 2024-09-01 NOTE — CARE COORDINATION
D/C order noted for today. Orders reviewed. Patient has all DME at home. Home health accepted and booked with [facility]. Family to transport patient home. CM remains available if needed.     AISLINN CardosoN, RN   [phone]

## 2024-09-01 NOTE — PLAN OF CARE
Problem: Skin/Tissue Integrity  Goal: Absence of new skin breakdown  Description: 1.  Monitor for areas of redness and/or skin breakdown  2.  Assess vascular access sites hourly  3.  Every 4-6 hours minimum:  Change oxygen saturation probe site  4.  Every 4-6 hours:  If on nasal continuous positive airway pressure, respiratory therapy assess nares and determine need for appliance change or resting period.  9/1/2024 0241 by Milena Nunez, RN  Outcome: Progressing  8/31/2024 1257 by Rhonda Piedra, RN  Outcome: Progressing     Problem: Safety - Adult  Goal: Free from fall injury  9/1/2024 0241 by Milena Nunez, RN  Outcome: Progressing  8/31/2024 1257 by Rhonda Piedra, RN  Outcome: Progressing     Problem: Pain  Goal: Verbalizes/displays adequate comfort level or baseline comfort level  Outcome: Progressing

## 2024-09-01 NOTE — PROGRESS NOTES
obtained and discussed in length  with the patient. Complete review of systems was negative/unremarkable  except as mentioned in HPI section.  Data Review:    Labs: Results:       Chemistry Recent Labs     08/30/24  0302 08/31/24  0533 09/01/24  0214    141 136   K 5.0 5.3 5.3    113* 110   CO2 17* 17* 17*   BUN 62* 55* 64*   GLOB 3.2 3.2  --       CBC w/Diff Recent Labs     08/30/24  0302 09/01/24  0214   WBC 20.5* 23.9*   RBC 3.55* 3.74*   HGB 10.3* 10.6*   HCT 26.8* 28.6*    174      Coagulation Recent Labs     08/30/24  0302   INR 1.9*       Iron/Ferritin No results for input(s): \"IRON\" in the last 72 hours.    Invalid input(s): \"TIBCCALC\"     BNP Invalid input(s): \"BNPP\"   Cardiac Enzymes No results for input(s): \"CPK\" in the last 72 hours.    Invalid input(s): \"CKRMB\", \"CKND1\", \"TROIP\", \"THOMAS\"   Liver Enzymes No results for input(s): \"TP\" in the last 72 hours.    Invalid input(s): \"ALB\", \"TBIL\", \"AP\", \"SGOT\", \"GPT\", \"DBIL\"   Thyroid Studies Lab Results   Component Value Date/Time    TSH 1.60 08/09/2022 11:30 AM         EKG: normal sinus rhythm.    Physical Assessment:   Visit Vitals  /69   Pulse (!) 104   Temp 98.6 °F (37 °C) (Oral)   Resp 18   Ht 1.7 m (5' 6.93\")   Wt 77.3 kg (170 lb 8 oz)   SpO2 97%   BMI 26.76 kg/m²     Weight change:     Intake/Output Summary (Last 24 hours) at 9/1/2024 1404  Last data filed at 8/31/2024 2225  Gross per 24 hour   Intake 600 ml   Output --   Net 600 ml     Physical Exam:   General: comfortable, no acute distress   HEENT sclera anicteric, supple neck, no thyromegaly  CVS: S1S2 heard,  no rub  RS: + air entry b/l,   Abd: Soft, Non tender, Not distended,   Neuro: non focal, awake, alert , CN II-XII are grossly intact  Extrm: edema, no cyanosis, clubbing   Skin: no visible  Rash  Musculoskeletal: No gross joints or bone deformities     Procedures/imaging: see electronic medical records for all procedures, Xrays and details which were not copied into this  note but were reviewed prior to creation of Plan    Discussed with primary team      ALKA GIPSON MD  September 1, 2024  [facility]  Office [phone]

## 2024-09-01 NOTE — PLAN OF CARE
Problem: Skin/Tissue Integrity  Goal: Absence of new skin breakdown  Description: 1.  Monitor for areas of redness and/or skin breakdown  2.  Assess vascular access sites hourly  3.  Every 4-6 hours minimum:  Change oxygen saturation probe site  4.  Every 4-6 hours:  If on nasal continuous positive airway pressure, respiratory therapy assess nares and determine need for appliance change or resting period.  9/1/2024 0942 by Rhonda Piedra, RN  Outcome: Progressing  9/1/2024 0241 by Milena Nunez, RN  Outcome: Progressing     Problem: Safety - Adult  Goal: Free from fall injury  9/1/2024 0942 by Rhonda Piedra, RN  Outcome: Progressing  9/1/2024 0241 by Milena Nunez, RN  Outcome: Progressing

## 2024-09-27 NOTE — PROGRESS NOTES
Physician Progress Note      PATIENT:               RADHA DUDLEY  [facility] #:                  [id number]  :                       1945  ADMIT DATE:       2024 10:17 AM  DISCH DATE:        2024 3:10 PM  RESPONDING  PROVIDER #:        Gisell Martinez MD          QUERY TEXT:    Patient admitted with neuroendocrine tumor. Documentation reflects sepsis in   progress note dated 2024.  If possible, please document in the progress   notes and discharge summary if sepsis was:    The medical record reflects the following:    Risk Factors: UTI , LUNA    Clinical Indicators: PROGRESS NOTE 2024 Sepsis, leukocytosis, abnormal   urinalysis. Leukocytosis POA, likely related to UTI E faecalis.  WBC -23.9 ,20.5, 20.0  [place] - 105, 103, 101 99,    Treatment: IV amoxicillin, serial labs    Thank you    Talat Blandon [facility], CDS  Options provided:  -- sepsis confirmed after study  -- sepsis ruled out after study  -- Other - I will add my own diagnosis  -- Disagree - Not applicable / Not valid  -- Disagree - Clinically unable to determine / Unknown  -- Refer to Clinical Documentation Reviewer    PROVIDER RESPONSE TEXT:    Leukocytosis POA, likely related to UTI.    Query created by: Talat Blandon on 2024 11:13 AM      Electronically signed by:  Gisell Martinez MD 2024 10:00 AM